# Patient Record
Sex: FEMALE | Race: WHITE | Employment: UNEMPLOYED | URBAN - METROPOLITAN AREA
[De-identification: names, ages, dates, MRNs, and addresses within clinical notes are randomized per-mention and may not be internally consistent; named-entity substitution may affect disease eponyms.]

---

## 2017-02-16 ENCOUNTER — APPOINTMENT (OUTPATIENT)
Dept: CT IMAGING | Age: 37
End: 2017-02-16
Attending: EMERGENCY MEDICINE
Payer: COMMERCIAL

## 2017-02-16 ENCOUNTER — HOSPITAL ENCOUNTER (EMERGENCY)
Age: 37
Discharge: HOME | End: 2017-02-16
Attending: EMERGENCY MEDICINE
Payer: COMMERCIAL

## 2017-02-16 ENCOUNTER — APPOINTMENT (OUTPATIENT)
Dept: ULTRASOUND IMAGING | Age: 37
End: 2017-02-16
Attending: EMERGENCY MEDICINE
Payer: COMMERCIAL

## 2017-02-16 VITALS
HEIGHT: 63 IN | HEART RATE: 87 BPM | SYSTOLIC BLOOD PRESSURE: 109 MMHG | BODY MASS INDEX: 28.77 KG/M2 | WEIGHT: 162.4 LBS | TEMPERATURE: 98.2 F | RESPIRATION RATE: 16 BRPM | OXYGEN SATURATION: 99 % | DIASTOLIC BLOOD PRESSURE: 68 MMHG

## 2017-02-16 DIAGNOSIS — R10.9 ACUTE ABDOMINAL PAIN: Primary | ICD-10-CM

## 2017-02-16 LAB
ALBUMIN SERPL BCP-MCNC: 4.1 G/DL (ref 3.5–5)
ALBUMIN/GLOB SERPL: 1.1 {RATIO} (ref 1.2–3.5)
ALP SERPL-CCNC: 44 U/L (ref 50–136)
ALT SERPL-CCNC: 21 U/L (ref 12–65)
ANION GAP BLD CALC-SCNC: 11 MMOL/L (ref 7–16)
AST SERPL W P-5'-P-CCNC: 13 U/L (ref 15–37)
BASOPHILS # BLD AUTO: 0 K/UL (ref 0–0.2)
BASOPHILS # BLD: 0 % (ref 0–2)
BILIRUB SERPL-MCNC: 0.4 MG/DL (ref 0.2–1.1)
BUN SERPL-MCNC: 9 MG/DL (ref 6–23)
CALCIUM SERPL-MCNC: 9.6 MG/DL (ref 8.3–10.4)
CHLORIDE SERPL-SCNC: 102 MMOL/L (ref 98–107)
CO2 SERPL-SCNC: 28 MMOL/L (ref 21–32)
CREAT SERPL-MCNC: 0.92 MG/DL (ref 0.6–1)
DIFFERENTIAL METHOD BLD: ABNORMAL
EOSINOPHIL # BLD: 0.1 K/UL (ref 0–0.8)
EOSINOPHIL NFR BLD: 1 % (ref 0.5–7.8)
ERYTHROCYTE [DISTWIDTH] IN BLOOD BY AUTOMATED COUNT: 15.3 % (ref 11.9–14.6)
GLOBULIN SER CALC-MCNC: 3.9 G/DL (ref 2.3–3.5)
GLUCOSE SERPL-MCNC: 99 MG/DL (ref 65–100)
HCG UR QL: NEGATIVE
HCT VFR BLD AUTO: 42.1 % (ref 35.8–46.3)
HGB BLD-MCNC: 13.4 G/DL (ref 11.7–15.4)
IMM GRANULOCYTES # BLD: 0 K/UL (ref 0–0.5)
IMM GRANULOCYTES NFR BLD AUTO: 0.2 % (ref 0–5)
LYMPHOCYTES # BLD AUTO: 27 % (ref 13–44)
LYMPHOCYTES # BLD: 2.9 K/UL (ref 0.5–4.6)
MCH RBC QN AUTO: 27.1 PG (ref 26.1–32.9)
MCHC RBC AUTO-ENTMCNC: 31.8 G/DL (ref 31.4–35)
MCV RBC AUTO: 85.1 FL (ref 79.6–97.8)
MONOCYTES # BLD: 0.6 K/UL (ref 0.1–1.3)
MONOCYTES NFR BLD AUTO: 5 % (ref 4–12)
NEUTS SEG # BLD: 7.3 K/UL (ref 1.7–8.2)
NEUTS SEG NFR BLD AUTO: 67 % (ref 43–78)
PLATELET # BLD AUTO: 362 K/UL (ref 150–450)
PMV BLD AUTO: 8.9 FL (ref 10.8–14.1)
POTASSIUM SERPL-SCNC: 3.8 MMOL/L (ref 3.5–5.1)
PROT SERPL-MCNC: 8 G/DL (ref 6.3–8.2)
RBC # BLD AUTO: 4.95 M/UL (ref 4.05–5.25)
SODIUM SERPL-SCNC: 141 MMOL/L (ref 136–145)
WBC # BLD AUTO: 10.9 K/UL (ref 4.3–11.1)

## 2017-02-16 PROCEDURE — 81003 URINALYSIS AUTO W/O SCOPE: CPT | Performed by: EMERGENCY MEDICINE

## 2017-02-16 PROCEDURE — 74011250636 HC RX REV CODE- 250/636: Performed by: EMERGENCY MEDICINE

## 2017-02-16 PROCEDURE — 81025 URINE PREGNANCY TEST: CPT

## 2017-02-16 PROCEDURE — 99284 EMERGENCY DEPT VISIT MOD MDM: CPT | Performed by: EMERGENCY MEDICINE

## 2017-02-16 PROCEDURE — 76856 US EXAM PELVIC COMPLETE: CPT

## 2017-02-16 PROCEDURE — 80053 COMPREHEN METABOLIC PANEL: CPT | Performed by: EMERGENCY MEDICINE

## 2017-02-16 PROCEDURE — 74176 CT ABD & PELVIS W/O CONTRAST: CPT

## 2017-02-16 PROCEDURE — 96374 THER/PROPH/DIAG INJ IV PUSH: CPT | Performed by: EMERGENCY MEDICINE

## 2017-02-16 PROCEDURE — 85025 COMPLETE CBC W/AUTO DIFF WBC: CPT | Performed by: EMERGENCY MEDICINE

## 2017-02-16 RX ORDER — MORPHINE SULFATE 4 MG/ML
4 INJECTION, SOLUTION INTRAMUSCULAR; INTRAVENOUS
Status: COMPLETED | OUTPATIENT
Start: 2017-02-16 | End: 2017-02-16

## 2017-02-16 RX ORDER — KETOROLAC TROMETHAMINE 30 MG/ML
30 INJECTION, SOLUTION INTRAMUSCULAR; INTRAVENOUS
Status: COMPLETED | OUTPATIENT
Start: 2017-02-16 | End: 2017-02-16

## 2017-02-16 RX ORDER — SODIUM CHLORIDE 0.9 % (FLUSH) 0.9 %
5-10 SYRINGE (ML) INJECTION AS NEEDED
Status: DISCONTINUED | OUTPATIENT
Start: 2017-02-16 | End: 2017-02-16 | Stop reason: HOSPADM

## 2017-02-16 RX ORDER — SODIUM CHLORIDE 0.9 % (FLUSH) 0.9 %
5-10 SYRINGE (ML) INJECTION EVERY 8 HOURS
Status: DISCONTINUED | OUTPATIENT
Start: 2017-02-16 | End: 2017-02-16 | Stop reason: HOSPADM

## 2017-02-16 RX ORDER — DICLOFENAC SODIUM 50 MG/1
50 TABLET, DELAYED RELEASE ORAL 2 TIMES DAILY
Qty: 14 TAB | Refills: 0 | Status: SHIPPED | OUTPATIENT
Start: 2017-02-16 | End: 2018-01-23

## 2017-02-16 RX ORDER — PROMETHAZINE HYDROCHLORIDE 25 MG/1
25 TABLET ORAL
Qty: 12 TAB | Refills: 0 | Status: SHIPPED | OUTPATIENT
Start: 2017-02-16 | End: 2019-03-21

## 2017-02-16 RX ADMIN — KETOROLAC TROMETHAMINE 30 MG: 30 INJECTION, SOLUTION INTRAMUSCULAR; INTRAVENOUS at 17:46

## 2017-02-16 RX ADMIN — MORPHINE SULFATE 4 MG: 4 INJECTION, SOLUTION INTRAMUSCULAR; INTRAVENOUS at 18:42

## 2017-02-17 NOTE — DISCHARGE INSTRUCTIONS
Return with any fevers, vomiting, blood in bowels or urine, worsening symptoms, or additional concerns. Follow up with your regular doctor for further care in 5-7 days.

## 2017-02-17 NOTE — ED PROVIDER NOTES
HPI Comments: 50-year-old lady with a history of pain in her lower abdomen into her back she says has been present off and on over the course of the past 24 hours. Patient says with her pain she has had some mild nausea but has had no vomiting or diarrhea. Patient says that she deals with pain daily due to her interstitial cystitis and her fibromyalgia she's had this pain seemed different. She denied any specific fevers or chills. Overall she's had her pain as a 10 out of 10. Elements of this note were created using speech recognition software. As such, there may be errors of speech recognition present. Patient is a 44 y.o. female presenting with abdominal pain. The history is provided by the patient. Abdominal Pain    Pertinent negatives include no fever, no diarrhea, no nausea, no vomiting, no dysuria, no hematuria, no headaches, no arthralgias, no myalgias and no chest pain. Past Medical History:   Diagnosis Date    Chronic UTI     Other ill-defined conditions(509.15)      fibromyalgia/ Interstitial cystis    Psychiatric disorder      anxiety, obsessive comp. Past Surgical History:   Procedure Laterality Date    Pr breast surgery procedure unlisted       reduction    Hx heent       tonsils/ sinus    Hx other surgical       Lymph node removal, right side of neck    Hx orthopaedic       right ankle, left foot    Pr abdomen surgery proc unlisted       inguinal hernia age 15    Hx cholecystectomy      Hx gyn       D&E/ Tubal         Family History:   Problem Relation Age of Onset    Cancer Mother     Cancer Sister        Social History     Social History    Marital status: SINGLE     Spouse name: N/A    Number of children: N/A    Years of education: N/A     Occupational History    Not on file.      Social History Main Topics    Smoking status: Current Every Day Smoker     Packs/day: 0.25    Smokeless tobacco: Never Used    Alcohol use No    Drug use: No    Sexual activity: Yes     Partners: Male     Birth control/ protection: Surgical     Other Topics Concern    Not on file     Social History Narrative         ALLERGIES: Latex; Adhesive tape-silicones; Codeine; Dilaudid [hydromorphone (bulk)]; Pcn [penicillins]; Reglan [metoclopramide]; and Sulfa (sulfonamide antibiotics)    Review of Systems   Constitutional: Negative for chills, diaphoresis and fever. HENT: Negative for congestion, rhinorrhea and sore throat. Eyes: Negative for redness and visual disturbance. Respiratory: Negative for cough, chest tightness, shortness of breath and wheezing. Cardiovascular: Negative for chest pain and palpitations. Gastrointestinal: Positive for abdominal pain. Negative for blood in stool, diarrhea, nausea and vomiting. Endocrine: Negative for polydipsia and polyuria. Genitourinary: Positive for pelvic pain. Negative for dysuria and hematuria. Musculoskeletal: Negative for arthralgias, myalgias and neck stiffness. Skin: Negative for rash. Allergic/Immunologic: Negative for environmental allergies and food allergies. Neurological: Negative for dizziness, weakness and headaches. Hematological: Negative for adenopathy. Does not bruise/bleed easily. Psychiatric/Behavioral: Negative for confusion and sleep disturbance. The patient is not nervous/anxious. Vitals:    02/16/17 1632 02/16/17 1917   BP: 106/74 114/71   Pulse: 88 93   Resp: 16    Temp: 98.5 °F (36.9 °C)    SpO2: 100% 99%   Weight: 73.7 kg (162 lb 6.4 oz)    Height: 5' 3\" (1.6 m)             Physical Exam   Constitutional: She is oriented to person, place, and time. She appears well-developed and well-nourished. HENT:   Head: Normocephalic and atraumatic. Eyes: Conjunctivae and EOM are normal. Pupils are equal, round, and reactive to light. Neck: Normal range of motion. Cardiovascular: Normal rate and regular rhythm.     Pulmonary/Chest: Effort normal and breath sounds normal. No respiratory distress. She has no wheezes. She has no rales. She exhibits no tenderness. Abdominal: Soft. Bowel sounds are normal. There is no rebound and no guarding. Mild diffuse tenderness to palpation without rebound or guarding. Musculoskeletal: Normal range of motion. She exhibits no edema or tenderness. Lymphadenopathy:     She has no cervical adenopathy. Neurological: She is alert and oriented to person, place, and time. Skin: Skin is warm and dry. Psychiatric: She has a normal mood and affect. Nursing note and vitals reviewed. MDM  Number of Diagnoses or Management Options  Diagnosis management comments: CT scan showed a questionable pelvic problem however the ultrasound was unremarkable. Her blood and urine tests were otherwise normal. I will discharge her home.     ED Course       Procedures

## 2017-04-03 ENCOUNTER — HOSPITAL ENCOUNTER (EMERGENCY)
Age: 37
Discharge: HOME | End: 2017-04-03
Attending: EMERGENCY MEDICINE
Payer: COMMERCIAL

## 2017-04-03 ENCOUNTER — APPOINTMENT (OUTPATIENT)
Dept: GENERAL RADIOLOGY | Age: 37
End: 2017-04-03
Attending: EMERGENCY MEDICINE
Payer: COMMERCIAL

## 2017-04-03 VITALS
OXYGEN SATURATION: 100 % | WEIGHT: 162 LBS | SYSTOLIC BLOOD PRESSURE: 116 MMHG | BODY MASS INDEX: 28.7 KG/M2 | TEMPERATURE: 98.1 F | DIASTOLIC BLOOD PRESSURE: 62 MMHG | HEART RATE: 95 BPM | RESPIRATION RATE: 20 BRPM | HEIGHT: 63 IN

## 2017-04-03 DIAGNOSIS — S93.601A RIGHT FOOT SPRAIN, INITIAL ENCOUNTER: Primary | ICD-10-CM

## 2017-04-03 PROCEDURE — 99283 EMERGENCY DEPT VISIT LOW MDM: CPT | Performed by: EMERGENCY MEDICINE

## 2017-04-03 PROCEDURE — 73630 X-RAY EXAM OF FOOT: CPT

## 2017-04-03 PROCEDURE — 73590 X-RAY EXAM OF LOWER LEG: CPT

## 2017-04-03 PROCEDURE — 74011250637 HC RX REV CODE- 250/637: Performed by: EMERGENCY MEDICINE

## 2017-04-03 RX ORDER — HYDROCODONE BITARTRATE AND ACETAMINOPHEN 7.5; 325 MG/1; MG/1
1 TABLET ORAL
Qty: 12 TAB | Refills: 0 | Status: SHIPPED | OUTPATIENT
Start: 2017-04-03 | End: 2017-11-14

## 2017-04-03 RX ORDER — HYDROCODONE BITARTRATE AND ACETAMINOPHEN 7.5; 325 MG/1; MG/1
1 TABLET ORAL
Status: COMPLETED | OUTPATIENT
Start: 2017-04-03 | End: 2017-04-03

## 2017-04-03 RX ADMIN — HYDROCODONE BITARTRATE AND ACETAMINOPHEN 1 TABLET: 7.5; 325 TABLET ORAL at 12:48

## 2017-04-03 NOTE — ED NOTES
I have reviewed discharge instructions with the patient. The patient verbalized understanding. Patient wheeled to car via tech in no acute distress. 1 prescription provided.       Mackenzie Mcmanus RN

## 2017-04-03 NOTE — DISCHARGE INSTRUCTIONS
Foot Sprain: Care Instructions  Your Care Instructions    A foot sprain occurs when you stretch or tear the ligaments around your foot. Ligaments are the tough tissues that connect one bone to another. A sprain can happen when you run, fall, or hit your toe against something. Sprains often happen when you jump or change direction quickly. This may occur when you play basketball, soccer, or other sports. Most foot sprains will get better with treatment at home. Follow-up care is a key part of your treatment and safety. Be sure to make and go to all appointments, and call your doctor if you are having problems. It's also a good idea to know your test results and keep a list of the medicines you take. How can you care for yourself at home? · Walk or put weight on your sprained foot as long as it does not hurt. · If your doctor gave you a splint or immobilizer, wear it as directed. If you were given crutches, use them as directed. · For the first 2 days after your injury, avoid hot showers, hot tubs, or hot packs. They may increase swelling. · Put ice or a cold pack on your foot for 10 to 20 minutes at a time to stop swelling. Try this every 1 to 2 hours for 3 days (when you are awake) or until the swelling goes down. Put a thin cloth between the ice pack and your skin. Keep your splint dry. · After 2 or 3 days, if your swelling is gone, put a heating pad (set on low) or a warm cloth on your foot. Some doctors suggest that you go back and forth between hot and cold treatments. · Prop up your foot on a pillow when you ice it or anytime you sit or lie down. Try to keep it above the level of your heart. This will help reduce swelling. · Take pain medicines exactly as directed. ¨ If the doctor gave you a prescription medicine for pain, take it as prescribed. ¨ If you are not taking a prescription pain medicine, ask your doctor if you can take an over-the-counter medicine.   · Do any exercises that your doctor or physical therapist suggests. · Return to your usual exercise gradually as you feel better. When should you call for help? Call your doctor now or seek immediate medical care if:  · You have increased or severe pain. · Your toes are cool or pale or change color. · Your wrap or splint feels too tight. · You have signs of a blood clot, such as:  ¨ Pain in your calf, back of the knee, thigh, or groin. ¨ Redness and swelling in your leg or groin. · You have tingling, weakness, or numbness in your leg or foot. Watch closely for changes in your health, and be sure to contact your doctor if:  · You cannot put any weight on your foot. · You get a fever. · You do not get better as expected. Where can you learn more? Go to http://hillary-adrianne.info/. Enter M888 in the search box to learn more about \"Foot Sprain: Care Instructions. \"  Current as of: June 21, 2016  Content Version: 11.2  © 5289-1964 [employer]. Care instructions adapted under license by [employer] (which disclaims liability or warranty for this information). If you have questions about a medical condition or this instruction, always ask your healthcare professional. [address] any warranty or liability for your use of this information.

## 2017-04-03 NOTE — ED PROVIDER NOTES
Patient is a 44 y.o. female presenting with foot injury. The history is provided by the patient and a relative. Foot Injury    This is a new problem. The current episode started less than 1 hour ago. The problem occurs constantly. The problem has not changed since onset. The pain is present in the right foot and right ankle. The quality of the pain is described as aching and constant. The pain is at a severity of 10/10. Associated symptoms include limited range of motion and stiffness. Pertinent negatives include no numbness, no tingling, no itching, no back pain and no neck pain. The symptoms are aggravated by movement and palpation. She has tried rest for the symptoms. The treatment provided no relief. There has been a history of trauma (twisted ankle when she stepped down). Past Medical History:   Diagnosis Date    Chronic UTI     Other ill-defined conditions     fibromyalgia/ Interstitial cystis    Psychiatric disorder     anxiety, obsessive comp.  Seizures ([facility] [address]        Past Surgical History:   Procedure Laterality Date    ABDOMEN SURGERY PROC UNLISTED      inguinal hernia age 15    BREAST SURGERY PROCEDURE UNLISTED      reduction    HX CHOLECYSTECTOMY      HX GYN      D&E/ Tubal    HX HEENT      tonsils/ sinus    HX ORTHOPAEDIC      right ankle, left foot    HX OTHER SURGICAL      Lymph node removal, right side of neck         Family History:   Problem Relation Age of Onset    Cancer Mother     Cancer Sister        Social History     Social History    Marital status: SINGLE     Spouse name: N/A    Number of children: N/A    Years of education: N/A     Occupational History    Not on file.      Social History Main Topics    Smoking status: Current Every Day Smoker     Packs/day: 0.25    Smokeless tobacco: Never Used    Alcohol use No    Drug use: No    Sexual activity: Yes     Partners: Male     Birth control/ protection: Surgical     Other Topics Concern    Not on file     Social History Narrative         ALLERGIES: Latex; Adhesive tape-silicones; Codeine; Dilaudid [hydromorphone (bulk)]; Pcn [penicillins]; Reglan [metoclopramide]; and Sulfa (sulfonamide antibiotics)    Review of Systems   Constitutional: Negative for chills and fever. Musculoskeletal: Positive for arthralgias and stiffness. Negative for back pain and neck pain. Skin: Negative for itching. Neurological: Negative for tingling and numbness. All other systems reviewed and are negative. Vitals:    04/03/17 1237   BP: 116/62   Pulse: 95   Resp: 20   Temp: 98.1 °F (36.7 °C)   SpO2: 100%   Weight: 73.5 kg (162 lb)   Height: 5' 3\" (1.6 m)            Physical Exam   Constitutional: She is oriented to person, place, and time. She appears well-developed and well-nourished. She appears distressed. HENT:   Head: Normocephalic and atraumatic. Right Ear: Tympanic membrane and external ear normal.   Left Ear: Tympanic membrane and external ear normal.   Mouth/Throat: Oropharynx is clear and moist.   Eyes: Conjunctivae and EOM are normal. Pupils are equal, round, and reactive to light. Neck: Normal range of motion. Neck supple. No tracheal deviation present. Cardiovascular: Normal rate, regular rhythm, normal heart sounds and intact distal pulses. Exam reveals no gallop and no friction rub. No murmur heard. Pulmonary/Chest: Effort normal and breath sounds normal. No respiratory distress. She has no wheezes. Musculoskeletal: She exhibits no edema. Right foot: There is decreased range of motion, tenderness, bony tenderness and swelling. There is normal capillary refill, no crepitus, no deformity and no laceration. Feet:    Lymphadenopathy:     She has no cervical adenopathy. Neurological: She is alert and oriented to person, place, and time. She has normal strength. She displays normal reflexes. No cranial nerve deficit or sensory deficit. Skin: Skin is warm and dry. No rash noted.  She is not diaphoretic. No erythema. Psychiatric: She has a normal mood and affect. Nursing note and vitals reviewed. MDM  ED Course       Procedures    The patient was observed in the ED. Results Reviewed:      XR TIB/FIB RT   Final Result   IMPRESSION: No acute osseous abnormality. XR FOOT RT MIN 3 V   Final Result   IMPRESSION: No acute osseous abnormality. I discussed the results of all labs, procedures, radiographs, and treatments with the patient and available family. Treatment plan is agreed upon and the patient is ready for discharge. All voiced understanding of the discharge plan and medication instructions or changes as appropriate. Questions about treatment in the ED were answered. All were encouraged to return should symptoms worsen or new problems develop.

## 2017-04-03 NOTE — ED TRIAGE NOTES
Patient states she stepped down today, felt a pop and rolled her right foot and ankle approx 1 hour ago. Bruising to right foot. Patient states she is unable to bear weight.

## 2017-11-14 PROBLEM — F34.1 DYSTHYMIA: Status: ACTIVE | Noted: 2017-11-14

## 2017-11-14 PROBLEM — G40.909 SEIZURE DISORDER (HCC): Status: ACTIVE | Noted: 2017-11-14

## 2017-11-14 PROBLEM — K21.00 GASTROESOPHAGEAL REFLUX DISEASE WITH ESOPHAGITIS: Status: ACTIVE | Noted: 2017-11-14

## 2017-11-14 PROBLEM — N30.10 INTERSTITIAL CYSTITIS: Status: ACTIVE | Noted: 2017-11-14

## 2017-11-14 PROBLEM — F51.01 PRIMARY INSOMNIA: Status: ACTIVE | Noted: 2017-11-14

## 2017-11-14 PROBLEM — M79.7 FIBROMYALGIA: Status: ACTIVE | Noted: 2017-11-14

## 2017-11-14 PROBLEM — J45.40 MODERATE PERSISTENT ASTHMA WITHOUT COMPLICATION: Status: ACTIVE | Noted: 2017-11-14

## 2017-12-05 PROBLEM — N30.10 INTERSTITIAL CYSTITIS: Chronic | Status: ACTIVE | Noted: 2017-11-14

## 2017-12-05 PROBLEM — M35.1 MIXED CONNECTIVE TISSUE DISEASE (HCC): Status: ACTIVE | Noted: 2017-12-05

## 2017-12-05 PROBLEM — E55.9 VITAMIN D DEFICIENCY: Status: ACTIVE | Noted: 2017-12-05

## 2017-12-05 PROBLEM — M35.1 MIXED CONNECTIVE TISSUE DISEASE (HCC): Chronic | Status: ACTIVE | Noted: 2017-12-05

## 2017-12-19 PROBLEM — F80.81 STUTTERING: Status: ACTIVE | Noted: 2017-12-19

## 2017-12-19 PROBLEM — Z79.899 ENCOUNTER FOR LONG-TERM (CURRENT) USE OF MEDICATIONS: Chronic | Status: ACTIVE | Noted: 2017-12-19

## 2017-12-19 PROBLEM — Z79.899 ENCOUNTER FOR LONG-TERM (CURRENT) USE OF MEDICATIONS: Status: ACTIVE | Noted: 2017-12-19

## 2018-01-10 ENCOUNTER — HOSPITAL ENCOUNTER (OUTPATIENT)
Dept: ULTRASOUND IMAGING | Age: 38
Discharge: HOME | End: 2018-01-10
Attending: FAMILY MEDICINE
Payer: COMMERCIAL

## 2018-01-10 DIAGNOSIS — R10.2 PELVIC PAIN: ICD-10-CM

## 2018-01-10 PROBLEM — D25.2 SUBSEROUS LEIOMYOMA OF UTERUS: Status: ACTIVE | Noted: 2018-01-10

## 2018-01-10 PROBLEM — D25.2 SUBSEROUS LEIOMYOMA OF UTERUS: Chronic | Status: ACTIVE | Noted: 2018-01-10

## 2018-01-10 PROCEDURE — 76830 TRANSVAGINAL US NON-OB: CPT

## 2018-01-10 NOTE — PROGRESS NOTES
Pelvic ultrasound: there is a 1.8 cm uterine fibroid of the fundus. There is also a 1.8 x 1.3 cm echogenic area that may be a polyp. Will place a referral to a gynecologist for further evaluation.

## 2018-01-23 PROBLEM — N92.0 MENORRHAGIA WITH REGULAR CYCLE: Status: ACTIVE | Noted: 2018-01-23

## 2018-01-23 PROBLEM — Z72.0 TOBACCO ABUSE: Status: ACTIVE | Noted: 2018-01-23

## 2018-01-23 PROBLEM — N94.6 DYSMENORRHEA: Status: ACTIVE | Noted: 2018-01-23

## 2018-02-22 ENCOUNTER — HOSPITAL ENCOUNTER (OUTPATIENT)
Dept: GENERAL RADIOLOGY | Age: 38
Discharge: HOME | End: 2018-02-22
Attending: INTERNAL MEDICINE
Payer: COMMERCIAL

## 2018-02-22 DIAGNOSIS — M54.89 INFLAMMATORY BACK PAIN: ICD-10-CM

## 2018-02-22 DIAGNOSIS — M13.0 POLYARTHRITIS: ICD-10-CM

## 2018-02-22 PROCEDURE — 73130 X-RAY EXAM OF HAND: CPT

## 2018-02-22 PROCEDURE — 72170 X-RAY EXAM OF PELVIS: CPT

## 2018-02-22 PROCEDURE — 73630 X-RAY EXAM OF FOOT: CPT

## 2018-02-22 PROCEDURE — 72100 X-RAY EXAM L-S SPINE 2/3 VWS: CPT

## 2018-05-23 PROBLEM — L40.50 PSORIATIC ARTHRITIS (HCC): Status: ACTIVE | Noted: 2018-05-23

## 2019-01-24 ENCOUNTER — HOSPITAL ENCOUNTER (OUTPATIENT)
Dept: GENERAL RADIOLOGY | Age: 39
Discharge: HOME | End: 2019-01-24
Payer: COMMERCIAL

## 2019-01-24 DIAGNOSIS — M25.562 ACUTE PAIN OF LEFT KNEE: ICD-10-CM

## 2019-01-24 PROBLEM — F33.0 MILD EPISODE OF RECURRENT MAJOR DEPRESSIVE DISORDER (HCC): Status: ACTIVE | Noted: 2019-01-24

## 2019-01-24 PROCEDURE — 73564 X-RAY EXAM KNEE 4 OR MORE: CPT

## 2019-01-28 NOTE — PROGRESS NOTES
X-ray of her knee is negative. Symptoms are most likely related to psoriatic arthritis. Continue to follow-up with rheumatology.

## 2019-03-21 PROBLEM — Z12.4 PAP SMEAR FOR CERVICAL CANCER SCREENING: Status: ACTIVE | Noted: 2019-03-21

## 2019-03-21 PROBLEM — Z12.31 SCREENING MAMMOGRAM, ENCOUNTER FOR: Status: ACTIVE | Noted: 2019-03-21

## 2019-03-21 PROBLEM — Z01.419 WOMEN'S ANNUAL ROUTINE GYNECOLOGICAL EXAMINATION: Status: ACTIVE | Noted: 2019-03-21

## 2019-04-09 ENCOUNTER — HOSPITAL ENCOUNTER (OUTPATIENT)
Dept: MAMMOGRAPHY | Age: 39
Discharge: HOME | End: 2019-04-09
Attending: OBSTETRICS & GYNECOLOGY
Payer: COMMERCIAL

## 2019-04-09 DIAGNOSIS — Z12.31 SCREENING MAMMOGRAM, ENCOUNTER FOR: ICD-10-CM

## 2019-04-09 PROCEDURE — 77067 SCR MAMMO BI INCL CAD: CPT

## 2019-05-13 ENCOUNTER — HOSPITAL ENCOUNTER (OUTPATIENT)
Dept: PHYSICAL THERAPY | Age: 39
Discharge: HOME | End: 2019-05-13
Attending: INTERNAL MEDICINE
Payer: COMMERCIAL

## 2019-05-13 NOTE — PROGRESS NOTES
Adam Arredondo  : 1980  Primary: [facility]  Secondary:  [facility] [address] [facility], [address]  [phone]   [phone]      PT Note:    Patient cancelled PT evaluation this AM secondary to illness. Patient to reschedule.      Dolly Hamlin, PT, DPT

## 2019-05-31 ENCOUNTER — HOSPITAL ENCOUNTER (OUTPATIENT)
Dept: PHYSICAL THERAPY | Age: 39
Discharge: HOME | End: 2019-05-31
Attending: INTERNAL MEDICINE
Payer: COMMERCIAL

## 2019-05-31 PROCEDURE — 97162 PT EVAL MOD COMPLEX 30 MIN: CPT

## 2019-05-31 NOTE — PROGRESS NOTES
Nisha Clements  : 1980  Primary: [facility]  [facility]:  [facility], [address]  [phone]   [phone]       OUTPATIENT PHYSICAL THERAPY: Daily Treatment Note 2019  Visit Count:  1  ICD-10: Treatment Diagnosis: Pain in left hip (M25.552)   Pain in right hip (M25.551)    Difficulty in walking, not elsewhere classified (R26.2)  Precautions/Allergies:   Latex; Adhesive tape-silicones; Codeine; Dilaudid [hydromorphone (bulk)]; Pcn [penicillins]; Reglan [metoclopramide]; and Sulfa (sulfonamide antibiotics)   TREATMENT PLAN:  Effective Dates/Frequency/Duration: Twice per week from 2019 until 2019 (8 weeks). Pre-treatment Symptoms/Complaints:  See history in chart  Pain: Initial:   2-3/10 Post Session:  4/10   Medications Last Reviewed:  2019  Updated Objective Findings: See evaluation note from today   TREATMENT:   Assessment only today, no treatment provided. Treatment/Session Summary:    · Response to Treatment:  See assessment in chart. No adverse reactions/unusual changes observed/reported in clinical status this session.   · Communication/Consultation:  None today  · Equipment provided today:  None today  · Recommendations/Intent for next treatment session: Next visit will focus on working on manual therapy/modalities as needed to reduce pain; gentle LE strengthening exercises    Total Treatment Billable Duration:  0 minutes  PT Patient Time In/Time Out  Time In: 1345  Time Out: Ari Hopkins DPT    Future Appointments   Date Time Provider David Ricardo   2019 10:40 AM Mamadou Ordaz MD [facility] P[facility] POW   2019 11:40 AM Sp Howell MD [facility]

## 2019-05-31 NOTE — THERAPY EVALUATION
Maricruz Escoto  : 1980  Primary: [facility]  Secondary:  Therapy Center at [facility] [address], [address]  [phone]   [phone]        OUTPATIENT PHYSICAL THERAPY:Initial Assessment 2019   ICD-10: Treatment Diagnosis: Pain in left hip (M25.552)   Pain in right hip (M25.551)    Difficulty in walking, not elsewhere classified (R26.2)  Precautions/Allergies:   Latex; Adhesive tape-silicones; Codeine; Dilaudid [hydromorphone (bulk)]; Pcn [penicillins]; Reglan [metoclopramide]; and Sulfa (sulfonamide antibiotics)   TREATMENT PLAN:  Effective Dates/Frequency/Duration: Twice per week from 2019 until 2019 (8 weeks). MEDICAL/REFERRING DIAGNOSIS:  Pain in right hip [M25.551]  Pain in left hip [M25.552]   DATE OF ONSET: 4 years prior to initial evaluation, with gradual worsening over time  REFERRING PHYSICIAN: Alton Li MD MD Orders: Evaluate and treat  Return MD Appointment: unspecified     INITIAL ASSESSMENT:  Maricruz Escoto is a 35 y.o. female who presents to physical therapy for gradual worsening of B hip pain. This session, pt demonstrated decreased B LE strength/endurance, increased pain with B hip mobility, multiple postural/gait deficits, decreased standing tolerance and decreased functional mobility as evident by a score of 36/80 on the Lower Extremity Functional Scale (with lower scores indicating increased disability) and decreased ability to perform sit to stand transition and daily chores without pain. Pt may benefit from skilled PT to address the above listed deficits to improve ability to perform pain-free ADLs/IADLs and to improve overall quality of life prior to discharge. PROBLEM LIST (Impacting functional limitations):  1. Decreased Strength  2. Decreased ADL/Functional Activities  3. Decreased Transfer Abilities  4. Decreased Ambulation Ability/Technique  5.  Decreased Balance  6. Increased Pain  7. Decreased Activity Tolerance  8. Decreased Pacing Skills  9. Increased Fatigue  10. Decreased Flexibility/Joint Mobility  11. Edema/Girth INTERVENTIONS PLANNED: (Treatment may consist of any combination of the following)  1. Balance Exercise  2. Bed Mobility  3. Cold  4. Electrical Stimulation  5. Family Education  6. Gait Training  7. Heat  8. Home Exercise Program (HEP)  9. Manual Therapy  10. Neuromuscular Re-education/Strengthening  11. Range of Motion (ROM)  12. Therapeutic Activites  13. Therapeutic Exercise/Strengthening  14. Transcutaneous Electrical Nerve Stimulation (TENS)  15. Transfer Training  16. Ultrasound (US)  17. Aquatic Therapy     GOALS: (Goals have been discussed and agreed upon with patient.)  Short-Term Functional Goals: Time Frame: 4 weeks  1. Pt will be compliant with HEP in order to increase LE strength/endurance/mobility to improve functional mobility and overall quality of life. 2. Pt will improve score on the Lower Extremity Functional Scale to 40/80 in order to demonstrate improved functional mobility and quality of life. 3. Pt will improve score on Timed Up and Go Test to 10 seconds with the least restrictive assistive device in order to decrease fall risk and increase safety and independence during ambulation. 4. Pt will report standing for > 15 minutes with minimal to no increase in pain in order to be able to stand for prolonged periods as needed to perform chores around her house. Discharge Goals: Time Frame: 8 weeks  1. Pt will be independent with HEP in order to increase LE strength/endurance/mobility to improve functional mobility and overall quality of life. 2. Pt will improve score on the Lower Extremity Functional Scale to 44/80 in order to demonstrate improved functional mobility and quality of life.    3. Pt will improve score on Timed Up and Go Test to 9 seconds with the least restrictive assistive device in order to decrease fall risk and increase safety and independence during ambulation. 4. Pt will report standing for > 20 minutes with minimal to no increase in pain in order to be able to stand for prolonged periods as needed to perform chores around her house. OUTCOME MEASURE:   Tool Used: Lower Extremity Functional Scale (LEFS)  Score:  Initial: 36/80 Most Recent: X/80 (Date: -- )   Interpretation of Score: 20 questions each scored on a 5 point scale with 0 representing \"extreme difficulty or unable to perform\" and 4 representing \"no difficulty\". The lower the score, the greater the functional disability. 80/80 represents no disability. Minimal detectable change is 9 points. Tool Used: Timed Up and Go (TUG)  Score:  Initial: 11 seconds no AD Most Recent: X seconds (Date: -- )   Interpretation of Score: The test measures, in seconds, the time taken by an individual to stand up from a standard arm chair (seat height 46 cm [18 in], arm height 65 cm [25.6 in]), walk a distance of 3 meters (118 in, approx 10 ft), turn, walk back to the chair and sit down. If the individual takes longer than 14 seconds to complete TUG, this indicates risk for falls. UPDATED OBJECTIVE FINDINGS:     See objective section below    FALL RISK:    Ambulatory/Rehab Services H2 Model Falls Risk Assessment   Risk Factors:       (2)  Any administered antiepileptics/anticonvulsants Ability to Rise from Chair:       (0)  Ability to rise in a single movement   Falls Prevention Plan:       No modifications necessary   Total: (5 or greater = High Risk): 2   ©2010 [facility] [employer]. All Rights Reserved. [facility] States Patent #4,[id number].  Federal Law prohibits the replication, distribution or use without written permission from [facility] of [address] [place] [address]:   · Patient is expected to demonstrate progress in strength, range of motion and functional technique to improve ability to perform pain-free ADLs/IADLs and to improve overall quality of life.  REASON FOR SERVICES/OTHER COMMENTS:  · Patient continues to require modification of therapeutic interventions to increase complexity of exercises. Total Duration:  PT Patient Time In/Time Out  Time In: 1345  Time Out: 1435    Rehabilitation Potential For Stated Goals: [address] therapy, I certify that the treatment plan above will be carried out by a therapist or under their direction. Thank you for this referral,  Man Tam DPT     Referring Physician Signature: Alvaro Hensley MD _______________________________ Date _____________     PAIN/SUBJECTIVE:   Initial: 2-3/10 Post Session:  4/10   HISTORY:   History of Injury/Illness (Reason for Referral): *History per pt or pt's family except where otherwise noted  Pt states she has connective tissue disease (mixed connective tissue disease- is on steroids about half of each year) and psoriatic arthritis, as well as spinal stenosis with several disc herniations. States for the past 4 years, her hips have hurt really badly. States the pain began slowly at first, but then got worse over time. States it makes it very difficult for her to stand up and walk (feels like one of her hips might give way). Pt states she has had to have surgeries on both feet due to fractures - she thinks this affects her ambulation, but hips started hurting many years after the surgeries (so does not seem to be a causal factor). Pt has difficulty bending down to pick items off floor and helping her  get dressed. She also has difficulty doing household chores, and she has to limit how often she cleans.   Past Medical History/Comorbidities:   Ms. Helen Ohara  has a past medical history of Abnormal Papanicolaou smear of cervix, Chronic UTI, Gastroesophageal reflux disease with esophagitis (11/14/2017), Moderate persistent asthma without complication (34/02/6339), Other ill-defined conditions(799.89), Primary insomnia (11/14/2017), Psychiatric disorder, Seizure disorder ([facility] [address] (11/14/2017), and Seizures ([facility] [address] She also has no past medical history of Aneurysm ([address] Arrhythmia, Arthritis, Autoimmune disease ([address] CAD (coronary artery disease), Cancer ([facility] [address] Chronic kidney disease, Chronic pain, COPD, Dementia, Diabetes ([facility] [address] Difficult intubation, Endocrine disease, Heart failure ([address] Hypertension, Infectious disease, Liver disease, Malignant hyperthermia due to anesthesia, Nausea & vomiting, Pseudocholinesterase deficiency, PUD (peptic ulcer disease), Stroke ([facility] [address] Thromboembolus ([facility] [address] Thyroid disease, Unspecified adverse effect of anesthesia, or Unspecified sleep apnea. Ms. Pema Alanis  has a past surgical history that includes pr breast surgery procedure unlisted; hx heent; hx other surgical; hx orthopaedic; pr abdomen surgery proc unlisted; hx cholecystectomy; hx gyn; hx tonsillectomy; hx appendectomy; pr lap,tubal cautery; and hx carpal tunnel release (Right, 04/10/2019). 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 y.o. Current Medications:       Current Outpatient Medications:     etanercept (ENBREL MINI) 50 mg/mL (0.98 mL) crtg, 50 mg by SubCUTAneous route every seven (7) days. , Disp: 3.94 mL, Rfl: 4    medroxyPROGESTERone (PROVERA) 10 mg tablet, Take 1 tab by mouth x 14 days each month as directed, Disp: 14 Tab, Rfl: 11    venlafaxine-SR (EFFEXOR-XR) 75 mg capsule, Take 1 Cap by mouth daily. , Disp: 30 Cap, Rfl: 5    ergocalciferol (ERGOCALCIFEROL) 50,000 unit capsule, TAKE 1 CAPSULE BY MOUTH EVERY 7 DAYS, Disp: 4 Cap, Rfl: 5    pantoprazole (PROTONIX) 40 mg tablet, Take 1 Tab by mouth daily. , Disp: 30 Tab, Rfl: 5    pentosan polysulfate sodium (ELMIRON) 100 mg capsule, Take 1 Cap by mouth Before breakfast, lunch, and dinner., Disp: 90 Cap, Rfl: 5    vitamin C-multivitamin-mineral 500 mg chew, Take 600 mg by mouth., Disp: , Rfl:     etodolac (LODINE) 400 mg tablet, Take  by mouth two (2) times daily (with meals). , Disp: , Rfl:     gabapentin (NEURONTIN) 600 mg tablet, Take 1,200 mg by mouth four (4) times daily. , Disp: , Rfl:     amitriptyline (ELAVIL) 10 mg tablet, Take  by mouth nightly., Disp: , Rfl:     levETIRAcetam (KEPPRA) 750 mg tablet, Take 750 mg by mouth two (2) times a day., Disp: , Rfl:     albuterol (VENTOLIN HFA) 90 mcg/actuation inhaler, Take  by inhalation. , Disp: , Rfl:     budesonide-formoterol (SYMBICORT) 160-4.5 mcg/actuation HFAA, Take 2 Puffs by inhalation two (2) times a day., Disp: , Rfl:     SINGULAIR 10 mg tablet, take 10 mg by mouth daily.  , Disp: , Rfl:    Date Last Reviewed:  5/31/2019    Number of Personal Factors/Comorbidities that affect the Plan of Care: 3+: HIGH COMPLEXITY   EXAMINATION:   Initial assessment on 5/31/2019   Observation/Orthostatic Postural Assessment:    The following postural deficits were noted in sitting: loss of cervical lordosis, increased thoracic kyphosis, forward head, elevated R shoulder, rounded shoulders and posterior pelvic tilt   The following postural deficits were noted in standing: forward trunk flexion, pt is overweight  Palpation:          PA mobilizations at L1 non-painful but stiff; PA mobilizations at L2-5 stiff and very painful (pt guarding throughout), PA mobilizations at B PSIS painful and stiff  ROM:    Initial measurement: (on 5/31/2019) Initial measurement: (on 5/31/2019) Reassessment measurement:  Reassessment measurement:      WFL throughout L LE, but painful at end range with following motions: hip flexion and ER (painful in hip joint), hip IR (painful in posterior L hip) WFL throughout R LE, but painful at end range with following motions: hip flexion and ER (painful in hip joint), hip IR (painful in posterior R hip)       Strength:     Initial measurement: (on 5/31/2019) Initial measurement: (on 5/31/2019)  Reassessment Reassessment    L LE: R LE:     Hip flexion  4/5 (slight pain in back) 4+/5 (pain in back and R posterior hip)     Hip extension 3+/5 (pain in lower back) 3+/5 (pain in lower back)     Hip abduction 4/5 (pain in L groin) 4/5 (pain in R groin)     Hip adduction 4+/5 (pain in L pelvic crest) 4+/5 (pain in R pelvic crest)     Hip IR  3+/5 (pain in hip joint) 4-/5 (pain in hip joint)     Hip ER 4/5 (pain in groin and hamstrings) 4/5 (pain in groin and hamstrings)     Knee flexion 4/5  4+/5 (pain in low back)     Knee extension 4/5  5/5      Ankle DF  4-/5  4-/5        Special Tests:          Scours: + for increased pain at B hips  No pelvic or LE asymmetry noted  Neurological Screen:        Myotomes:  WFL        Dermatomes:  No deficits noted  Functional Mobility:   Gait/Ambulation: Pt ambulates with no AD with following gait deficits: increased forward trunk lean, slower gait speed, altered arm swing, decreased trunk rotation, decreased B stance time, decreased B step length, excessive L knee flexion during L stance phase, excessive R knee flexion during R stance phase, decreased B foot clearance and decreased B heel strike         Transfers:  WFL        Bed Mobility:  [facility]  Balance:          Decreased - slight lateral sway during ambulation  Sensation:         No deficits noted      Body Structures Involved:  1. Nerves  2. Bones  3. Joints  4. Muscles  5. Ligaments Body Functions Affected:  1. Sensory/Pain  2. Neuromusculoskeletal  3. Movement Related  4. Skin Related Activities and Participation Affected:  1. General Tasks and Demands  2. Mobility  3. Self Care  4. Domestic Life  5. Interpersonal Interactions and Relationships  6.  Community, Social and [place]   Number of elements (examined above) that affect the Plan of Care: 4+: HIGH COMPLEXITY   CLINICAL PRESENTATION:   Presentation: Evolving clinical presentation with changing clinical characteristics: MODERATE COMPLEXITY   CLINICAL DECISION MAKING:   Use of outcome tool(s) and clinical judgement create a POC that gives a: Questionable prediction of patient's progress: MODERATE COMPLEXITY

## 2019-06-10 ENCOUNTER — HOSPITAL ENCOUNTER (OUTPATIENT)
Dept: PHYSICAL THERAPY | Age: 39
Discharge: HOME | End: 2019-06-10
Attending: INTERNAL MEDICINE
Payer: COMMERCIAL

## 2019-06-10 PROCEDURE — 97110 THERAPEUTIC EXERCISES: CPT

## 2019-06-10 NOTE — PROGRESS NOTES
Baljit Wall  : 1980  Primary: [facility]  Secondary:  [address]  at [facility]  [address] [facility], [place] [address] [address] [facility]  [phone]   [phone]       OUTPATIENT PHYSICAL THERAPY: Daily Treatment Note 6/10/2019  Visit Count:  2  ICD-10: Treatment Diagnosis: Pain in left hip (M25.552)   Pain in right hip (M25.551)    Difficulty in walking, not elsewhere classified (R26.2)  Precautions/Allergies:   Latex; Adhesive tape-silicones; Codeine; Dilaudid [hydromorphone (bulk)]; Pcn [penicillins]; Reglan [metoclopramide]; and Sulfa (sulfonamide antibiotics)   TREATMENT PLAN:  Effective Dates/Frequency/Duration: Twice per week from 2019 until 2019 (8 weeks). Pre-treatment Symptoms/Complaints:  Pt 20 minutes late to session today - states her  had last minute appointment that he had to go to (states he was in hospital at her last missed appointment)  Pain: Initial:   -3/10 Post Session:  No pain verbalized at end of session   Medications Last Reviewed:  6/10/2019  Updated Objective Findings: None Today   TREATMENT:   Therapeutic Exercise: ( 23 minutes):  Exercises per grid below to improve mobility, strength and dynamic movement of leg - bilateral to improve functional mobility. Required minimal visual, verbal and manual cues to promote proper body alignment, promote proper body posture, promote proper body mechanics and promote proper body breathing techniques. Progressed resistance, range, repetitions and complexity of movement as indicated.     Date:  6/10/2019 Date:   Date:   Activity/Exercise Parameters Parameters    Nustep  L2 resistance for 5 minutes with B LEs only to increase strength/endurance     Ankle plantarflexor stretch on incline board 30 second hold x 3 reps with knees extended then flexed     LTR in hooklying 20 reps/1 set with 5-10 second hold each direction     Supine hamstring stretch 30 second hold x 3 reps each LE with pt stretching with belt - cues to hold for stretch     Geovanny test stretch in supine 30 second hold x 3 reps each LE with overpressure by therapist                 *given in HEP  [employer] Portal   Pt given following band colors: [] Yellow          [] Red          [] Green          [] Blue          [] Grey     Treatment/Session Summary:    · Response to Treatment:  Pt able to perform all LE exercises this session with minimal to no reports of increased pain. Pt would benefit from aquatic therapy for further LE strengthening, since she has increased pain with longer periods of weightbearing, with goal of developing HEP that she can work on at [facility]. No adverse reactions/unusual changes observed/reported in clinical status this session.   · Communication/Consultation:  None today  · Equipment provided today:  None today  · Recommendations/Intent for next treatment session: Next visit will focus on working on manual therapy/modalities as needed to reduce pain; gentle LE strengthening exercises    Total Treatment Billable Duration:  23 minutes  PT Patient Time In/Time Out  Time In: 1450  Time Out: [facility]    Future Appointments   Date Time Provider David Ricardo   6/14/2019  1:00 PM Rohan Gregorio DPT [facility] SFD   7/24/2019 10:40 AM Alcon Montalvo MD [facility] POW POW   8/6/2019 11:40 AM Chey Knowles MD [facility]

## 2019-06-14 ENCOUNTER — HOSPITAL ENCOUNTER (OUTPATIENT)
Dept: PHYSICAL THERAPY | Age: 39
Discharge: HOME | End: 2019-06-14
Attending: INTERNAL MEDICINE
Payer: COMMERCIAL

## 2019-06-14 PROCEDURE — 97110 THERAPEUTIC EXERCISES: CPT

## 2019-06-14 NOTE — PROGRESS NOTES
Kate Patrick  : 1980  Primary: [facility]  Secondary:  [address]  at [facility]  [address], [address], [place] [address] [address] [facility]  [phone]   [phone]       OUTPATIENT PHYSICAL THERAPY: Daily Treatment Note 2019  Visit Count:  3  ICD-10: Treatment Diagnosis: Pain in left hip (M25.552)   Pain in right hip (M25.551)    Difficulty in walking, not elsewhere classified (R26.2)  Precautions/Allergies:   Latex; Adhesive tape-silicones; Codeine; Dilaudid [hydromorphone (bulk)]; Pcn [penicillins]; Reglan [metoclopramide]; and Sulfa (sulfonamide antibiotics)   TREATMENT PLAN:  Effective Dates/Frequency/Duration: Twice per week from 2019 until 2019 (8 weeks). Pre-treatment Symptoms/Complaints:  Pt states no issues since last session  Pain: Initial:   7/10 Post Session:  4/10   Medications Last Reviewed:  2019  Updated Objective Findings: None Today   TREATMENT:   Therapeutic Exercise: ( 40 minutes):  Exercises per grid below to improve mobility, strength and dynamic movement of leg - bilateral to improve functional mobility. Required minimal visual, verbal and manual cues to promote proper body alignment, promote proper body posture, promote proper body mechanics and promote proper body breathing techniques. Progressed resistance, range, repetitions and complexity of movement as indicated.     Date:  6/10/2019 Date:  2019 Date:   Activity/Exercise Parameters Parameters    Nustep  L2 resistance for 5 minutes with B LEs only to increase strength/endurance L3 resistance for 10 minutes with B LEs only to increase strength/endurance    Ankle plantarflexor stretch on incline board 30 second hold x 3 reps with knees extended then flexed 30 second hold x 3 reps with knees extended then flexed    LTR in hooklying 20 reps/1 set with 5-10 second hold each direction 20 reps/1 set with 5-10 second hold each direction    Supine hamstring stretch* 30 second hold x 3 reps each LE with pt stretching with belt - cues to hold for stretch 30 second hold x 3 reps each LE with pt stretching with belt - cues to hold for stretch    Geovanny test stretch in supine* 30 second hold x 3 reps each LE with overpressure by therapist 30 second hold x 3 reps each LE with overpressure by therapist    Bridging in hooklying*  20 reps/1 set; pt reporting slight discomfort in lower back and groin    Sidelying clamshells*  20 reps/1 set; with cues for correct technique                            *given in HEP  [employer] Portal   Pt given following band colors: [] Yellow          [] Red          [] Green          [] Blue          [] Grey     Treatment/Session Summary:    · Response to Treatment:  Pt demonstrating good ability to perform all exercises this session with less pain reported at end of session. No adverse reactions/unusual changes observed/reported in clinical status this session.   · Communication/Consultation:  None today  · Equipment provided today:  None today  · Recommendations/Intent for next treatment session: Next visit will focus on working on manual therapy/modalities as needed to reduce pain; gentle LE strengthening exercises    Total Treatment Billable Duration:  40 minutes  PT Patient Time In/Time Out  Time In: 1259  Time Out: [address], DPT    Future Appointments   Date Time Provider David Ricardo   6/17/2019  1:00 PM Fiona Trimble DPT [facility] SFD   6/20/2019  1:00 PM EMEKA Momin [facility]LL   6/24/2019  1:00 PM EMEKA Momin   6/28/2019  1:00 PM Fiona Trimble DPT [facility] SFD   7/24/2019 10:40 AM Kami Ta MD [facility] POW POW   8/6/2019 11:40 AM Kei Parks MD [facility]

## 2019-06-17 ENCOUNTER — HOSPITAL ENCOUNTER (OUTPATIENT)
Dept: PHYSICAL THERAPY | Age: 39
Discharge: HOME | End: 2019-06-17
Attending: INTERNAL MEDICINE
Payer: COMMERCIAL

## 2019-06-17 NOTE — PROGRESS NOTES
Therapy Center at [facility]   [address], [address] [facility], [address]  [phone]   [phone]     OUTPATIENT DAILY NOTE    NAME: Sudhakar Holguin    DATE: 6/17/2019    Patient canceled physical therapy appointment today due to having headache. Will plan to follow up on next scheduled visit.     Wilman Muñoz DPT    Future Appointments   Date Time Provider David Ricardo   6/20/2019  1:00 PM Ewelina Hayes DPT [facility]   6/24/2019  1:00 PM Ewelina Hayes DPT [facility]   6/25/2019 10:15 AM Jarod Toney [facility]   7/2/2019 11:00 AM Jarod Toney [facility]   7/5/2019 11:00 AM Ewelina Hayes DPT [facility] D   7/9/2019 11:45 [facility]   7/11/2019 11:00 AM Eddie MARIEE DPT [facility]   7/15/2019 11:00 AM Ewelina Hayes DPT [facility]   7/18/2019 11:45 AM Jarod Toney [facility]   7/24/2019 10:40 AM Janes Arnold MD [facility] POW POW   8/1/2019 11:45 AM Jarod Toney [facility]   8/2/2019 11:00 AM Eddie MARIEE DPT [facility]   8/6/2019 11:40 AM Mary Jane Santos MD [facility]

## 2019-06-25 ENCOUNTER — APPOINTMENT (OUTPATIENT)
Dept: PHYSICAL THERAPY | Age: 39
End: 2019-06-25
Payer: COMMERCIAL

## 2019-06-28 ENCOUNTER — APPOINTMENT (OUTPATIENT)
Dept: PHYSICAL THERAPY | Age: 39
End: 2019-06-28
Attending: INTERNAL MEDICINE
Payer: COMMERCIAL

## 2019-07-02 ENCOUNTER — APPOINTMENT (OUTPATIENT)
Dept: PHYSICAL THERAPY | Age: 39
End: 2019-07-02

## 2019-07-05 ENCOUNTER — APPOINTMENT (OUTPATIENT)
Dept: PHYSICAL THERAPY | Age: 39
End: 2019-07-05
Attending: INTERNAL MEDICINE

## 2019-07-09 ENCOUNTER — APPOINTMENT (OUTPATIENT)
Dept: PHYSICAL THERAPY | Age: 39
End: 2019-07-09

## 2019-07-11 ENCOUNTER — APPOINTMENT (OUTPATIENT)
Dept: PHYSICAL THERAPY | Age: 39
End: 2019-07-11
Attending: INTERNAL MEDICINE

## 2019-07-15 ENCOUNTER — APPOINTMENT (OUTPATIENT)
Dept: PHYSICAL THERAPY | Age: 39
End: 2019-07-15
Attending: INTERNAL MEDICINE

## 2019-07-18 ENCOUNTER — APPOINTMENT (OUTPATIENT)
Dept: PHYSICAL THERAPY | Age: 39
End: 2019-07-18

## 2019-07-24 PROBLEM — Z12.4 PAP SMEAR FOR CERVICAL CANCER SCREENING: Status: RESOLVED | Noted: 2019-03-21 | Resolved: 2019-07-24

## 2019-07-24 PROBLEM — F34.1 DYSTHYMIA: Status: RESOLVED | Noted: 2017-11-14 | Resolved: 2019-07-24

## 2019-07-24 PROBLEM — Z79.899 ENCOUNTER FOR LONG-TERM (CURRENT) USE OF MEDICATIONS: Chronic | Status: RESOLVED | Noted: 2017-12-19 | Resolved: 2019-07-24

## 2019-07-24 PROBLEM — N94.6 DYSMENORRHEA: Status: RESOLVED | Noted: 2018-01-23 | Resolved: 2019-07-24

## 2019-07-24 PROBLEM — Z12.31 SCREENING MAMMOGRAM, ENCOUNTER FOR: Status: RESOLVED | Noted: 2019-03-21 | Resolved: 2019-07-24

## 2019-08-01 ENCOUNTER — APPOINTMENT (OUTPATIENT)
Dept: PHYSICAL THERAPY | Age: 39
End: 2019-08-01

## 2019-08-02 ENCOUNTER — APPOINTMENT (OUTPATIENT)
Dept: PHYSICAL THERAPY | Age: 39
End: 2019-08-02
Attending: INTERNAL MEDICINE

## 2019-08-30 ENCOUNTER — APPOINTMENT (OUTPATIENT)
Dept: CT IMAGING | Age: 39
End: 2019-08-30
Attending: EMERGENCY MEDICINE
Payer: COMMERCIAL

## 2019-08-30 ENCOUNTER — HOSPITAL ENCOUNTER (EMERGENCY)
Age: 39
Discharge: HOME | End: 2019-08-31
Attending: EMERGENCY MEDICINE
Payer: COMMERCIAL

## 2019-08-30 DIAGNOSIS — R51.9 ACUTE NONINTRACTABLE HEADACHE, UNSPECIFIED HEADACHE TYPE: Primary | ICD-10-CM

## 2019-08-30 LAB
ALBUMIN SERPL-MCNC: 4 G/DL (ref 3.5–5)
ALBUMIN/GLOB SERPL: 1.3 {RATIO} (ref 1.2–3.5)
ALP SERPL-CCNC: 37 U/L (ref 50–136)
ALT SERPL-CCNC: 18 U/L (ref 12–65)
ANION GAP SERPL CALC-SCNC: 8 MMOL/L (ref 7–16)
AST SERPL-CCNC: 17 U/L (ref 15–37)
BASOPHILS # BLD: 0.1 K/UL (ref 0–0.2)
BASOPHILS NFR BLD: 1 % (ref 0–2)
BILIRUB SERPL-MCNC: 0.4 MG/DL (ref 0.2–1.1)
BUN SERPL-MCNC: 12 MG/DL (ref 6–23)
CALCIUM SERPL-MCNC: 8.8 MG/DL (ref 8.3–10.4)
CHLORIDE SERPL-SCNC: 105 MMOL/L (ref 98–107)
CO2 SERPL-SCNC: 26 MMOL/L (ref 21–32)
CREAT SERPL-MCNC: 0.92 MG/DL (ref 0.6–1)
DIFFERENTIAL METHOD BLD: ABNORMAL
EOSINOPHIL # BLD: 0.2 K/UL (ref 0–0.8)
EOSINOPHIL NFR BLD: 2 % (ref 0.5–7.8)
ERYTHROCYTE [DISTWIDTH] IN BLOOD BY AUTOMATED COUNT: 13.5 % (ref 11.9–14.6)
GLOBULIN SER CALC-MCNC: 3.2 G/DL (ref 2.3–3.5)
GLUCOSE SERPL-MCNC: 88 MG/DL (ref 65–100)
HCT VFR BLD AUTO: 41 % (ref 35.8–46.3)
HGB BLD-MCNC: 13.2 G/DL (ref 11.7–15.4)
IMM GRANULOCYTES # BLD AUTO: 0 K/UL (ref 0–0.5)
IMM GRANULOCYTES NFR BLD AUTO: 0 % (ref 0–5)
LYMPHOCYTES # BLD: 4.1 K/UL (ref 0.5–4.6)
LYMPHOCYTES NFR BLD: 38 % (ref 13–44)
MCH RBC QN AUTO: 27.9 PG (ref 26.1–32.9)
MCHC RBC AUTO-ENTMCNC: 32.2 G/DL (ref 31.4–35)
MCV RBC AUTO: 86.7 FL (ref 79.6–97.8)
MONOCYTES # BLD: 0.7 K/UL (ref 0.1–1.3)
MONOCYTES NFR BLD: 6 % (ref 4–12)
NEUTS SEG # BLD: 5.7 K/UL (ref 1.7–8.2)
NEUTS SEG NFR BLD: 53 % (ref 43–78)
NRBC # BLD: 0 K/UL (ref 0–0.2)
PLATELET # BLD AUTO: 408 K/UL (ref 150–450)
PMV BLD AUTO: 9 FL (ref 9.4–12.3)
POTASSIUM SERPL-SCNC: 3.3 MMOL/L (ref 3.5–5.1)
PROT SERPL-MCNC: 7.2 G/DL (ref 6.3–8.2)
RBC # BLD AUTO: 4.73 M/UL (ref 4.05–5.2)
SODIUM SERPL-SCNC: 139 MMOL/L (ref 136–145)
WBC # BLD AUTO: 10.9 K/UL (ref 4.3–11.1)

## 2019-08-30 PROCEDURE — 96361 HYDRATE IV INFUSION ADD-ON: CPT | Performed by: EMERGENCY MEDICINE

## 2019-08-30 PROCEDURE — 80053 COMPREHEN METABOLIC PANEL: CPT

## 2019-08-30 PROCEDURE — 96374 THER/PROPH/DIAG INJ IV PUSH: CPT | Performed by: EMERGENCY MEDICINE

## 2019-08-30 PROCEDURE — 74011250636 HC RX REV CODE- 250/636: Performed by: EMERGENCY MEDICINE

## 2019-08-30 PROCEDURE — 70450 CT HEAD/BRAIN W/O DYE: CPT

## 2019-08-30 PROCEDURE — 96375 TX/PRO/DX INJ NEW DRUG ADDON: CPT | Performed by: EMERGENCY MEDICINE

## 2019-08-30 PROCEDURE — 99284 EMERGENCY DEPT VISIT MOD MDM: CPT | Performed by: EMERGENCY MEDICINE

## 2019-08-30 PROCEDURE — 85025 COMPLETE CBC W/AUTO DIFF WBC: CPT

## 2019-08-30 PROCEDURE — 81003 URINALYSIS AUTO W/O SCOPE: CPT | Performed by: EMERGENCY MEDICINE

## 2019-08-30 RX ORDER — KETOROLAC TROMETHAMINE 30 MG/ML
30 INJECTION, SOLUTION INTRAMUSCULAR; INTRAVENOUS
Status: COMPLETED | OUTPATIENT
Start: 2019-08-30 | End: 2019-08-30

## 2019-08-30 RX ORDER — PROCHLORPERAZINE EDISYLATE 5 MG/ML
10 INJECTION INTRAMUSCULAR; INTRAVENOUS
Status: COMPLETED | OUTPATIENT
Start: 2019-08-30 | End: 2019-08-30

## 2019-08-30 RX ORDER — DIPHENHYDRAMINE HYDROCHLORIDE 50 MG/ML
25 INJECTION, SOLUTION INTRAMUSCULAR; INTRAVENOUS
Status: COMPLETED | OUTPATIENT
Start: 2019-08-30 | End: 2019-08-30

## 2019-08-30 RX ADMIN — DIPHENHYDRAMINE HYDROCHLORIDE 25 MG: 50 INJECTION, SOLUTION INTRAMUSCULAR; INTRAVENOUS at 23:12

## 2019-08-30 RX ADMIN — PROCHLORPERAZINE EDISYLATE 10 MG: 5 INJECTION INTRAMUSCULAR; INTRAVENOUS at 23:12

## 2019-08-30 RX ADMIN — SODIUM CHLORIDE 1000 ML: 900 INJECTION, SOLUTION INTRAVENOUS at 23:12

## 2019-08-30 RX ADMIN — KETOROLAC TROMETHAMINE 30 MG: 30 INJECTION, SOLUTION INTRAMUSCULAR at 23:12

## 2019-08-31 VITALS
BODY MASS INDEX: 31.18 KG/M2 | RESPIRATION RATE: 16 BRPM | HEIGHT: 63 IN | DIASTOLIC BLOOD PRESSURE: 63 MMHG | SYSTOLIC BLOOD PRESSURE: 114 MMHG | HEART RATE: 65 BPM | TEMPERATURE: 98.2 F | OXYGEN SATURATION: 96 % | WEIGHT: 176 LBS

## 2019-08-31 RX ORDER — BUTALBITAL, ASPIRIN, AND CAFFEINE 325; 50; 40 MG/1; MG/1; MG/1
1 CAPSULE ORAL
Qty: 15 CAP | Refills: 0 | Status: SHIPPED | OUTPATIENT
Start: 2019-08-31 | End: 2019-11-29

## 2019-08-31 NOTE — ED TRIAGE NOTES
Pt arrives to triage wrapped in fleece blanket. Reports posterior head pain radiating to right temporal area. Describes as \"sharp and pressure all at once\". States \"feels like its gonna pop\". Reports neck pain and \"stiffness\" radiating down entire spine to lower back. Reports diarrhea, nausea and chills. Onset of symptoms last night with worsening of symptoms. Reports light and sound sensitivity. Denies injury or trauma. Reports similar symptoms approx 17years ago, diagnosed with meningitis at that time. Hx migraines. atttempted motrin approx 2 hours pta. Reports increased pain with movement of head. Reports weakness, dizziness with position changes and intermittent episodes of tingling to left arm throughout day today.

## 2019-08-31 NOTE — DISCHARGE INSTRUCTIONS

## 2019-08-31 NOTE — ED NOTES
I have reviewed discharge instructions with the patient. The patient verbalized understanding. Patient left ED via Discharge Method: ambulatory to Home with (insert name of family/friend, self, transport family). Opportunity for questions and clarification provided. Patient given 1 scripts. To continue your aftercare when you leave the hospital, you may receive an automated call from our care team to check in on how you are doing. This is a free service and part of our promise to provide the best care and service to meet your aftercare needs.  If you have questions, or wish to unsubscribe from this service please call [phone]. Thank you for Choosing our [facility] Emergency Department.

## 2019-08-31 NOTE — ED PROVIDER NOTES
Luz Joe is a 35 y.o. female who presents to the ED with a chief complaint of headache. Headache is diffuse and posterior and she feels a lot of pressure. She also has multiple other complaints. She started feeling bad yesterday. She states her neck and entire back hurt. She also reports some diffuse body aches. She has had 4 episodes of diarrhea. No vomiting. She does have a history of migraines in the past.  She has had no fevers. Past Medical History:   Diagnosis Date    Abnormal Papanicolaou smear of cervix     Chronic UTI     Gastroesophageal reflux disease with esophagitis 11/14/2017    Moderate persistent asthma without complication 86/70/5616    Other ill-defined conditions(799.89)     fibromyalgia/ Interstitial cystis    Primary insomnia 11/14/2017    Psychiatric disorder     anxiety, obsessive comp.      Seizure disorder ([address] 11/14/2017    Seizures ([facility] [address]        Past Surgical History:   Procedure Laterality Date    ABDOMEN SURGERY PROC UNLISTED      inguinal hernia age 15    BREAST SURGERY PROCEDURE UNLISTED      reduction    HX APPENDECTOMY      HX CARPAL TUNNEL RELEASE Right 04/10/2019    HX CARPAL TUNNEL RELEASE Right     HX CHOLECYSTECTOMY      HX GYN      D&E/ Tubal    HX HEENT      tonsils/ sinus    HX ORTHOPAEDIC      right ankle, left foot    HX OTHER SURGICAL      Lymph node removal, right side of neck    HX TONSILLECTOMY      LAP,TUBAL CAUTERY           Family History:   Problem Relation Age of Onset    Cancer Mother     Diabetes Mother     Heart Disease Mother         Aortic Rupture    Stroke Mother     Cancer Sister         skin    Diabetes Maternal Grandmother     Heart Disease Maternal Grandmother     Breast Cancer Maternal Grandmother     Heart Disease Maternal Grandfather     Breast Cancer Paternal Great Grandmother     Breast Cancer Maternal Great Grandmother     Ovarian Cancer Neg Hx     Colon Cancer Neg Hx     Uterine Cancer Neg Hx        Social History     Socioeconomic History    Marital status: SINGLE     Spouse name: Not on file    Number of children: Not on file    Years of education: Not on file    Highest education level: Not on file   Occupational History    Not on file   Social Needs    Financial resource strain: Not on file    Food insecurity:     Worry: Not on file     Inability: Not on file    Transportation needs:     Medical: Not on file     Non-medical: Not on file   Tobacco Use    Smoking status: Current Every Day Smoker     Packs/day: 0.25    Smokeless tobacco: Never Used    Tobacco comment: Pt is vaping but is trying to quit.  [facility] 2/22/18   Substance and Sexual Activity    Alcohol use: No    Drug use: No    Sexual activity: Yes     Partners: Male     Birth control/protection: Surgical   Lifestyle    Physical activity:     Days per week: Not on file     Minutes per session: Not on file    Stress: Not on file   Relationships    Social connections:     Talks on phone: Not on file     Gets together: Not on file     Attends [employer] service: Not on file     Active member of club or organization: Not on file     Attends meetings of clubs or organizations: Not on file     Relationship status: Not on file    Intimate partner violence:     Fear of current or ex partner: Not on file     Emotionally abused: Not on file     Physically abused: Not on file     Forced sexual activity: Not on file   Other Topics Concern     Service Not Asked    Blood Transfusions Not Asked    Caffeine Concern No    Occupational Exposure Not Asked   Luz Schlichter Hazards Not Asked    Sleep Concern Not Asked    Stress Concern Not Asked    Weight Concern Not Asked    Special Diet Not Asked    Back Care Not Asked    Exercise Yes    Bike Helmet Not Asked    Seat Belt Yes    Self-Exams Yes   Social History Narrative    Abuse: Feels safe at home, no history of physical abuse, no history of sexual abuse         ALLERGIES: Latex; Adhesive tape-silicones; Codeine; Dilaudid [hydromorphone (bulk)]; Pcn [penicillins]; Reglan [metoclopramide]; and Sulfa (sulfonamide antibiotics)    Review of Systems   Constitutional: Positive for chills and fatigue. Negative for fever. Respiratory: Negative for chest tightness, shortness of breath, wheezing and stridor. Cardiovascular: Negative for chest pain and palpitations. Gastrointestinal: Positive for diarrhea and nausea. Negative for abdominal pain, constipation and vomiting. Musculoskeletal: Positive for back pain, myalgias and neck pain. Negative for arthralgias, gait problem and neck stiffness. Skin: Negative for color change, pallor and wound. Neurological: Negative for weakness and numbness. All other systems reviewed and are negative. Vitals:    08/30/19 2143   BP: 132/89   Pulse: 87   Resp: 16   Temp: 98.2 °F (36.8 °C)   SpO2: 100%   Weight: 79.8 kg (176 lb)   Height: 5' 2.5\" (1.588 m)            Physical Exam   Constitutional: She is oriented to person, place, and time. She appears well-developed and well-nourished. No distress. HENT:   Head: Normocephalic and atraumatic. Right Ear: External ear normal.   Left Ear: External ear normal.   Eyes: Conjunctivae are normal. No scleral icterus. Neck: Normal range of motion. Cardiovascular: Intact distal pulses. Pulmonary/Chest: Effort normal and breath sounds normal. No stridor. No respiratory distress. She has no wheezes. She has no rales. Abdominal: Soft. She exhibits no mass. There is no tenderness. There is no guarding. Neurological: She is alert and oriented to person, place, and time. No cranial nerve deficit. Coordination normal.   Skin: Skin is warm and dry. Capillary refill takes less than 2 seconds. She is not diaphoretic. Psychiatric: She has a normal mood and affect. Her behavior is normal.   Nursing note and vitals reviewed.        MDM  Number of Diagnoses or Management Options  Diagnosis management comments: Patient has negative CT scan she is feeling better on reevaluation. I do not see any signs of meningitis I did offer to do LP given her history but she declined this. She looks like she has a viral syndrome. I am going to treat symptomatically. Ange Oreilly MD; 8/31/2019 @12:11 AM Voice dictation software was used during the making of this note. This software is not perfect and grammatical and other typographical errors may be present.   This note has not been proofread for errors.  ===================================================================          Amount and/or Complexity of Data Reviewed  Clinical lab tests: ordered and reviewed (Results for orders placed or performed during the hospital encounter of 08/30/19  -CBC WITH AUTOMATED DIFF       Result                      Value             Ref Range           WBC                         10.9              4.3 - 11.1 K*       RBC                         4.73              4.05 - 5.2 M*       HGB                         13.2              11.7 - 15.4 *       HCT                         41.0              35.8 - 46.3 %       MCV                         86.7              79.6 - 97.8 *       MCH                         27.9              26.1 - 32.9 *       MCHC                        32.2              31.4 - 35.0 *       RDW                         13.5              11.9 - 14.6 %       PLATELET                    408               150 - 450 K/*       MPV                         9.0 (L)           9.4 - 12.3 FL       ABSOLUTE NRBC               0.00              0.0 - 0.2 K/*       DF                          AUTOMATED                             NEUTROPHILS                 53                43 - 78 %           LYMPHOCYTES                 38                13 - 44 %           MONOCYTES                   6                 4.0 - 12.0 %        EOSINOPHILS                 2                 0.5 - 7.8 %         BASOPHILS                   1 0.0 - 2.0 %         IMMATURE GRANULOCYTES       0                 0.0 - 5.0 %         ABS. NEUTROPHILS            5.7               1.7 - 8.2 K/*       ABS. LYMPHOCYTES            4.1               0.5 - 4.6 K/*       ABS. MONOCYTES              0.7               0.1 - 1.3 K/*       ABS. EOSINOPHILS            0.2               0.0 - 0.8 K/*       ABS. BASOPHILS              0.1               0.0 - 0.2 K/*       ABS. IMM. GRANS.            0.0               0.0 - 0.5 K/*  -METABOLIC PANEL, COMPREHENSIVE       Result                      Value             Ref Range           Sodium                      139               136 - 145 mm*       Potassium                   3.3 (L)           3.5 - 5.1 mm*       Chloride                    105               98 - 107 mmo*       CO2                         26                21 - 32 mmol*       Anion gap                   8                 7 - 16 mmol/L       Glucose                     88                65 - 100 mg/*       BUN                         12                6 - 23 MG/DL        Creatinine                  0.92              0.6 - 1.0 MG*       GFR est AA                  >60               >60 ml/min/1*       GFR est non-AA              >60               >60 ml/min/1*       Calcium                     8.8               8.3 - 10.4 M*       Bilirubin, total            0.4               0.2 - 1.1 MG*       ALT (SGPT)                  18                12 - 65 U/L         AST (SGOT)                  17                15 - 37 U/L         Alk.  phosphatase            37 (L)            50 - 136 U/L        Protein, total              7.2               6.3 - 8.2 g/*       Albumin                     4.0               3.5 - 5.0 g/*       Globulin                    3.2               2.3 - 3.5 g/*       A-G Ratio                   1.3               1.2 - 3.5     )  Tests in the radiology section of CPT®: ordered and reviewed (Ct Head Wo Cont    Result Date: 8/30/2019  EXAM: Noncontrast CT head. INDICATION: Headache. COMPARISON: October 12, 2014. TECHNIQUE: Noncontrast CT images of the head were obtained. Radiation dose reduction techniques were used for this study. Our CT scanners use one or all of the following:  Automated exposure control, adjustment of the mA or kV according to patient size, iterative reconstruction. FINDINGS: Brain volume is appropriate for age. No acute infarct, hemorrhage or mass is identified. There is no mass effect, midline shift or depressed fracture. The visualized paranasal sinuses and mastoid air cells are clear.      IMPRESSION: No acute process.   )           Procedures

## 2019-09-03 NOTE — THERAPY EVALUATION
Tatiana Celestin  : 1980  Primary: [facility]  Secondary:  Therapy Center at [facility], [address] [facility], [address]  [phone]   [phone]        OUTPATIENT PHYSICAL THERAPY:Discontinuation Summary 2019   ICD-10: Treatment Diagnosis: Pain in left hip (M25.552)   Pain in right hip (M25.551)    Difficulty in walking, not elsewhere classified (R26.2)  Precautions/Allergies:   Latex; Adhesive tape-silicones; Codeine; Dilaudid [hydromorphone (bulk)]; Pcn [penicillins]; Reglan [metoclopramide]; and Sulfa (sulfonamide antibiotics)   TREATMENT PLAN:  Effective Dates/Frequency/Duration: Twice per week from 2019 until 2019 (8 weeks). MEDICAL/REFERRING DIAGNOSIS:  Pain in right hip [M25.551]  Pain in left hip [M25.552]   DATE OF ONSET: 4 years prior to initial evaluation, with gradual worsening over time  REFERRING PHYSICIAN: Torri Luna MD MD Orders: Evaluate and treat  Return MD Appointment: unspecified     Tatiana Celestin has been seen in physical therapy from 2019 to 2019 for 3 visits. Treatment has been discontinued at this time due to patient noncompliant with attending/scheduling PT sessions and expiration of plan of care without further referral by ordering physician. The below goals were not reassessed secondary to pt failing to attend additional sessions prior to discharge. Thank you for this referral.    PROBLEM LIST (Impacting functional limitations):  1. Decreased Strength  2. Decreased ADL/Functional Activities  3. Decreased Transfer Abilities  4. Decreased Ambulation Ability/Technique  5. Decreased Balance  6. Increased Pain  7. Decreased Activity Tolerance  8. Decreased Pacing Skills  9. Increased Fatigue  10. Decreased Flexibility/Joint Mobility  11. Edema/Girth INTERVENTIONS PLANNED: (Treatment may consist of any combination of the following)  1. Balance Exercise  2.  Bed Mobility  3. Cold  4. Electrical Stimulation  5. Family Education  6. Gait Training  7. Heat  8. Home Exercise Program (HEP)  9. Manual Therapy  10. Neuromuscular Re-education/Strengthening  11. Range of Motion (ROM)  12. Therapeutic Activites  13. Therapeutic Exercise/Strengthening  14. Transcutaneous Electrical Nerve Stimulation (TENS)  15. Transfer Training  16. Ultrasound (US)  17. Aquatic Therapy     GOALS: (Goals have been discussed and agreed upon with patient.)  Short-Term Functional Goals: Time Frame: 4 weeks  1. Pt will be compliant with HEP in order to increase LE strength/endurance/mobility to improve functional mobility and overall quality of life. 2. Pt will improve score on the Lower Extremity Functional Scale to 40/80 in order to demonstrate improved functional mobility and quality of life. 3. Pt will improve score on Timed Up and Go Test to 10 seconds with the least restrictive assistive device in order to decrease fall risk and increase safety and independence during ambulation. 4. Pt will report standing for > 15 minutes with minimal to no increase in pain in order to be able to stand for prolonged periods as needed to perform chores around her house. Discharge Goals: Time Frame: 8 weeks  1. Pt will be independent with HEP in order to increase LE strength/endurance/mobility to improve functional mobility and overall quality of life. 2. Pt will improve score on the Lower Extremity Functional Scale to 44/80 in order to demonstrate improved functional mobility and quality of life. 3. Pt will improve score on Timed Up and Go Test to 9 seconds with the least restrictive assistive device in order to decrease fall risk and increase safety and independence during ambulation. 4. Pt will report standing for > 20 minutes with minimal to no increase in pain in order to be able to stand for prolonged periods as needed to perform chores around her house.       OUTCOME MEASURE:   Tool Used: Lower Extremity Functional Scale (LEFS)  Score:  Initial: 36/80 Most Recent: X/80 (Date: -- )   Interpretation of Score: 20 questions each scored on a 5 point scale with 0 representing \"extreme difficulty or unable to perform\" and 4 representing \"no difficulty\". The lower the score, the greater the functional disability. 80/80 represents no disability. Minimal detectable change is 9 points. Tool Used: Timed Up and Go (TUG)  Score:  Initial: 11 seconds no AD Most Recent: X seconds (Date: -- )   Interpretation of Score: The test measures, in seconds, the time taken by an individual to stand up from a standard arm chair (seat height 46 cm [18 in], arm height 65 cm [25.6 in]), walk a distance of 3 meters (118 in, approx 10 ft), turn, walk back to the chair and sit down. If the individual takes longer than 14 seconds to complete TUG, this indicates risk for falls. FALL RISK:    Ambulatory/Rehab Services H2 Model Falls Risk Assessment   Risk Factors:       (2)  Any administered antiepileptics/anticonvulsants Ability to Rise from Chair:       (0)  Ability to rise in a single movement   Falls Prevention Plan:       No modifications necessary   Total: (5 or greater = High Risk): 2   ©2010 [facility] of [employer]. All Rights Reserved. [facility] Patent #[id number]. Federal Law prohibits the replication, distribution or use without written permission from [facility] of [address] [place] [address] Avenue:   · Patient is expected to demonstrate progress in strength, range of motion and functional technique to improve ability to perform pain-free ADLs/IADLs and to improve overall quality of life. REASON FOR SERVICES/OTHER COMMENTS:  · Patient continues to require modification of therapeutic interventions to increase complexity of exercises.     Rehabilitation Potential For Stated Goals: Fair    Thank you for this referral,  Adriano Epstein DPT     Referring Physician Signature: Neil Friedman MD

## 2019-09-20 PROBLEM — G43.909 MIGRAINES: Status: ACTIVE | Noted: 2018-10-17

## 2019-09-20 PROBLEM — G56.01 CARPAL TUNNEL SYNDROME ON RIGHT: Status: ACTIVE | Noted: 2019-03-19

## 2019-09-20 PROBLEM — R56.9 SEIZURES (HCC): Status: ACTIVE | Noted: 2017-11-14

## 2019-10-09 PROBLEM — Z87.891 PERSONAL HISTORY OF TOBACCO USE: Status: ACTIVE | Noted: 2018-01-23

## 2019-10-09 PROBLEM — J45.30 MILD PERSISTENT ASTHMA WITHOUT COMPLICATION: Status: ACTIVE | Noted: 2017-11-14

## 2019-10-28 ENCOUNTER — HOSPITAL ENCOUNTER (OUTPATIENT)
Dept: CT IMAGING | Age: 39
Discharge: HOME | End: 2019-10-28
Attending: FAMILY MEDICINE
Payer: COMMERCIAL

## 2019-10-28 DIAGNOSIS — R05.9 COUGH: ICD-10-CM

## 2019-10-28 DIAGNOSIS — R91.1 LUNG NODULE: ICD-10-CM

## 2019-10-28 DIAGNOSIS — R06.02 SHORTNESS OF BREATH: ICD-10-CM

## 2019-10-28 DIAGNOSIS — J40 BRONCHITIS: ICD-10-CM

## 2019-10-28 DIAGNOSIS — J04.0 ACUTE LARYNGITIS: ICD-10-CM

## 2019-10-28 PROCEDURE — 71260 CT THORAX DX C+: CPT

## 2019-10-28 PROCEDURE — 74011000258 HC RX REV CODE- 258: Performed by: FAMILY MEDICINE

## 2019-10-28 PROCEDURE — 74011636320 HC RX REV CODE- 636/320: Performed by: FAMILY MEDICINE

## 2019-10-28 RX ORDER — SODIUM CHLORIDE 0.9 % (FLUSH) 0.9 %
10 SYRINGE (ML) INJECTION
Status: COMPLETED | OUTPATIENT
Start: 2019-10-28 | End: 2019-10-28

## 2019-10-28 RX ADMIN — IOPAMIDOL 80 ML: 755 INJECTION, SOLUTION INTRAVENOUS at 13:52

## 2019-10-28 RX ADMIN — SODIUM CHLORIDE 100 ML: 900 INJECTION, SOLUTION INTRAVENOUS at 13:52

## 2019-10-28 RX ADMIN — Medication 10 ML: at 13:52

## 2019-10-31 NOTE — PROGRESS NOTES
CT Chest: there are a few scattered lung nodules that are less than 4 mm and are considered not suspicious for malignancy. There is a 9 mm lymph node near the sternum that is probably a reactive lymph node. The radiologist has suggested a follow up CT in one year. Patient notified via [address]

## 2019-11-11 ENCOUNTER — APPOINTMENT (OUTPATIENT)
Dept: GENERAL RADIOLOGY | Age: 39
End: 2019-11-11
Attending: EMERGENCY MEDICINE
Payer: COMMERCIAL

## 2019-11-11 ENCOUNTER — HOSPITAL ENCOUNTER (EMERGENCY)
Age: 39
Discharge: HOME | End: 2019-11-11
Attending: EMERGENCY MEDICINE
Payer: COMMERCIAL

## 2019-11-11 ENCOUNTER — APPOINTMENT (OUTPATIENT)
Dept: CT IMAGING | Age: 39
End: 2019-11-11
Attending: EMERGENCY MEDICINE
Payer: COMMERCIAL

## 2019-11-11 VITALS
RESPIRATION RATE: 39 BRPM | SYSTOLIC BLOOD PRESSURE: 95 MMHG | OXYGEN SATURATION: 97 % | HEART RATE: 101 BPM | DIASTOLIC BLOOD PRESSURE: 53 MMHG

## 2019-11-11 DIAGNOSIS — R07.81 PLEURITIC CHEST PAIN: Primary | ICD-10-CM

## 2019-11-11 LAB
ALBUMIN SERPL-MCNC: 3.8 G/DL (ref 3.5–5)
ALBUMIN/GLOB SERPL: 1 {RATIO} (ref 1.2–3.5)
ALP SERPL-CCNC: 36 U/L (ref 50–136)
ALT SERPL-CCNC: 21 U/L (ref 12–65)
ANION GAP SERPL CALC-SCNC: 7 MMOL/L (ref 7–16)
AST SERPL-CCNC: 15 U/L (ref 15–37)
BACTERIA URNS QL MICRO: NORMAL /HPF
BASOPHILS # BLD: 0.1 K/UL (ref 0–0.2)
BASOPHILS NFR BLD: 0 % (ref 0–2)
BILIRUB SERPL-MCNC: 0.3 MG/DL (ref 0.2–1.1)
BUN SERPL-MCNC: 10 MG/DL (ref 6–23)
CALCIUM SERPL-MCNC: 9.4 MG/DL (ref 8.3–10.4)
CASTS URNS QL MICRO: 0 /LPF
CHLORIDE SERPL-SCNC: 105 MMOL/L (ref 98–107)
CO2 SERPL-SCNC: 27 MMOL/L (ref 21–32)
CREAT SERPL-MCNC: 1.03 MG/DL (ref 0.6–1)
CRYSTALS URNS QL MICRO: 0 /LPF
DIFFERENTIAL METHOD BLD: ABNORMAL
EOSINOPHIL # BLD: 0.2 K/UL (ref 0–0.8)
EOSINOPHIL NFR BLD: 2 % (ref 0.5–7.8)
EPI CELLS #/AREA URNS HPF: NORMAL /HPF
ERYTHROCYTE [DISTWIDTH] IN BLOOD BY AUTOMATED COUNT: 13.8 % (ref 11.9–14.6)
GLOBULIN SER CALC-MCNC: 3.9 G/DL (ref 2.3–3.5)
GLUCOSE SERPL-MCNC: 119 MG/DL (ref 65–100)
HCT VFR BLD AUTO: 45.5 % (ref 35.8–46.3)
HGB BLD-MCNC: 14.5 G/DL (ref 11.7–15.4)
IMM GRANULOCYTES # BLD AUTO: 0 K/UL (ref 0–0.5)
IMM GRANULOCYTES NFR BLD AUTO: 0 % (ref 0–5)
LYMPHOCYTES # BLD: 3.7 K/UL (ref 0.5–4.6)
LYMPHOCYTES NFR BLD: 31 % (ref 13–44)
MCH RBC QN AUTO: 28.3 PG (ref 26.1–32.9)
MCHC RBC AUTO-ENTMCNC: 31.9 G/DL (ref 31.4–35)
MCV RBC AUTO: 88.9 FL (ref 79.6–97.8)
MONOCYTES # BLD: 0.6 K/UL (ref 0.1–1.3)
MONOCYTES NFR BLD: 5 % (ref 4–12)
MUCOUS THREADS URNS QL MICRO: 0 /LPF
NEUTS SEG # BLD: 7.3 K/UL (ref 1.7–8.2)
NEUTS SEG NFR BLD: 61 % (ref 43–78)
NRBC # BLD: 0 K/UL (ref 0–0.2)
OTHER OBSERVATIONS,UCOM: NORMAL
PLATELET # BLD AUTO: 326 K/UL (ref 150–450)
PMV BLD AUTO: 9 FL (ref 9.4–12.3)
POTASSIUM SERPL-SCNC: 3.4 MMOL/L (ref 3.5–5.1)
PROT SERPL-MCNC: 7.7 G/DL (ref 6.3–8.2)
RBC # BLD AUTO: 5.12 M/UL (ref 4.05–5.2)
RBC #/AREA URNS HPF: 0 /HPF
SODIUM SERPL-SCNC: 139 MMOL/L (ref 136–145)
TROPONIN I SERPL-MCNC: <0.02 NG/ML (ref 0.02–0.05)
WBC # BLD AUTO: 11.9 K/UL (ref 4.3–11.1)
WBC URNS QL MICRO: NORMAL /HPF

## 2019-11-11 PROCEDURE — 71260 CT THORAX DX C+: CPT

## 2019-11-11 PROCEDURE — 93005 ELECTROCARDIOGRAM TRACING: CPT | Performed by: EMERGENCY MEDICINE

## 2019-11-11 PROCEDURE — 74011000250 HC RX REV CODE- 250: Performed by: EMERGENCY MEDICINE

## 2019-11-11 PROCEDURE — 96374 THER/PROPH/DIAG INJ IV PUSH: CPT | Performed by: EMERGENCY MEDICINE

## 2019-11-11 PROCEDURE — 81015 MICROSCOPIC EXAM OF URINE: CPT

## 2019-11-11 PROCEDURE — 85025 COMPLETE CBC W/AUTO DIFF WBC: CPT

## 2019-11-11 PROCEDURE — 94640 AIRWAY INHALATION TREATMENT: CPT

## 2019-11-11 PROCEDURE — 80053 COMPREHEN METABOLIC PANEL: CPT

## 2019-11-11 PROCEDURE — 74011000258 HC RX REV CODE- 258: Performed by: EMERGENCY MEDICINE

## 2019-11-11 PROCEDURE — 74011636320 HC RX REV CODE- 636/320: Performed by: EMERGENCY MEDICINE

## 2019-11-11 PROCEDURE — 71046 X-RAY EXAM CHEST 2 VIEWS: CPT

## 2019-11-11 PROCEDURE — 99285 EMERGENCY DEPT VISIT HI MDM: CPT | Performed by: EMERGENCY MEDICINE

## 2019-11-11 PROCEDURE — 84484 ASSAY OF TROPONIN QUANT: CPT

## 2019-11-11 PROCEDURE — 81003 URINALYSIS AUTO W/O SCOPE: CPT | Performed by: EMERGENCY MEDICINE

## 2019-11-11 PROCEDURE — 74011250636 HC RX REV CODE- 250/636: Performed by: EMERGENCY MEDICINE

## 2019-11-11 RX ORDER — KETOROLAC TROMETHAMINE 30 MG/ML
30 INJECTION, SOLUTION INTRAMUSCULAR; INTRAVENOUS
Status: COMPLETED | OUTPATIENT
Start: 2019-11-11 | End: 2019-11-11

## 2019-11-11 RX ORDER — SODIUM CHLORIDE 0.9 % (FLUSH) 0.9 %
10 SYRINGE (ML) INJECTION
Status: COMPLETED | OUTPATIENT
Start: 2019-11-11 | End: 2019-11-11

## 2019-11-11 RX ORDER — IPRATROPIUM BROMIDE AND ALBUTEROL SULFATE 2.5; .5 MG/3ML; MG/3ML
3 SOLUTION RESPIRATORY (INHALATION)
Status: COMPLETED | OUTPATIENT
Start: 2019-11-11 | End: 2019-11-11

## 2019-11-11 RX ORDER — INDOMETHACIN 25 MG/1
50 CAPSULE ORAL 3 TIMES DAILY
Qty: 60 CAP | Refills: 0 | Status: SHIPPED | OUTPATIENT
Start: 2019-11-11 | End: 2019-11-21

## 2019-11-11 RX ADMIN — SODIUM CHLORIDE 100 ML: 900 INJECTION, SOLUTION INTRAVENOUS at 22:04

## 2019-11-11 RX ADMIN — Medication 10 ML: at 21:19

## 2019-11-11 RX ADMIN — IPRATROPIUM BROMIDE AND ALBUTEROL SULFATE 3 ML: .5; 3 SOLUTION RESPIRATORY (INHALATION) at 20:45

## 2019-11-11 RX ADMIN — IOPAMIDOL 100 ML: 755 INJECTION, SOLUTION INTRAVENOUS at 22:04

## 2019-11-11 RX ADMIN — KETOROLAC TROMETHAMINE 30 MG: 30 INJECTION, SOLUTION INTRAMUSCULAR at 21:17

## 2019-11-11 NOTE — ED TRIAGE NOTES
Patient reports chest pain for several weeks. Over the last week pain has gotten increasing worse. Pain radiates from center of chest to left side of neck. Pain described as sharp and pressure. Also reports shortness or breath for a month. Placed on antibiotics and steroids with no relief. Dry cough.  Denies n/v/d

## 2019-11-12 LAB
ATRIAL RATE: 104 BPM
CALCULATED P AXIS, ECG09: 46 DEGREES
CALCULATED R AXIS, ECG10: 10 DEGREES
CALCULATED T AXIS, ECG11: 53 DEGREES
DIAGNOSIS, 93000: NORMAL
P-R INTERVAL, ECG05: 146 MS
Q-T INTERVAL, ECG07: 326 MS
QRS DURATION, ECG06: 66 MS
QTC CALCULATION (BEZET), ECG08: 428 MS
VENTRICULAR RATE, ECG03: 104 BPM

## 2019-11-12 NOTE — DISCHARGE INSTRUCTIONS
There is no evidence of damage to your heart  Your CT was unremarkable  Labs are otherwise unremarkable well  Use the strong, prescription strength anti-inflammatory medications for this pain  This may just be pleurisy.     Otherwise, please follow-up with your primary care doctor

## 2019-11-12 NOTE — ED PROVIDER NOTES
50-year-old female presenting for sharp anterior chest wall pain is been going on for 2 weeks. The history is provided by the patient. Shortness of Breath   This is a new problem. The problem occurs rarely. The current episode started more than 1 week ago. The problem has not changed since onset. Associated symptoms include cough and chest pain. Pertinent negatives include no fever, no headaches, no coryza, no rhinorrhea, no sore throat, no swollen glands, no ear pain, no neck pain, no sputum production, no hemoptysis, no wheezing, no PND, no orthopnea, no syncope, no vomiting, no abdominal pain, no rash, no leg pain, no leg swelling and no claudication. It is unknown what precipitated the problem. She has tried nothing for the symptoms. The treatment provided no relief. She has had no prior hospitalizations. She has had no prior ED visits. She has had no prior ICU admissions. Past Medical History:   Diagnosis Date    Abnormal Papanicolaou smear of cervix     Chronic UTI     Gastroesophageal reflux disease with esophagitis 11/14/2017    Moderate persistent asthma without complication 43/52/8187    Other ill-defined conditions(799.89)     fibromyalgia/ Interstitial cystis    Primary insomnia 11/14/2017    Psoriatic arthritis (HCC)     Psychiatric disorder     anxiety, obsessive comp.      Seizure disorder ([facility] [address] 11/14/2017    Seizures ([facility] [address]        Past Surgical History:   Procedure Laterality Date    ABDOMEN SURGERY PROC UNLISTED      inguinal hernia age 15    BREAST SURGERY PROCEDURE UNLISTED      reduction    HX APPENDECTOMY      HX CARPAL TUNNEL RELEASE Right 04/10/2019    HX CARPAL TUNNEL RELEASE Right     HX CHOLECYSTECTOMY      HX GYN      D&E/ Tubal    HX HEENT      tonsils/ sinus    HX ORTHOPAEDIC      right ankle, left foot    HX OTHER SURGICAL      Lymph node removal, right side of neck    HX TONSILLECTOMY      LAP,TUBAL CAUTERY           Family History:   Problem Relation Age of Onset    Cancer Mother     Diabetes Mother     Heart Disease Mother         Aortic Rupture    Stroke Mother     Heart Attack Mother 40        had several TIAs    Cancer Sister         skin    Diabetes Maternal Grandmother     Heart Disease Maternal Grandmother     Breast Cancer Maternal Grandmother     Heart Disease Maternal Grandfather     Breast Cancer Paternal Great Grandmother     Breast Cancer Maternal Great Grandmother     Ovarian Cancer Neg Hx     Colon Cancer Neg Hx     Uterine Cancer Neg Hx        Social History     Socioeconomic History    Marital status: SINGLE     Spouse name: Not on file    Number of children: Not on file    Years of education: Not on file    Highest education level: Not on file   Occupational History    Not on file   Social Needs    Financial resource strain: Not on file    Food insecurity:     Worry: Not on file     Inability: Not on file    Transportation needs:     Medical: Not on file     Non-medical: Not on file   Tobacco Use    Smoking status: Former Smoker     Packs/day: 0.25     Years: 16.00     Pack years: 4.00     Types: Cigarettes     Last attempt to quit: 2019     Years since quittin.5    Smokeless tobacco: Never Used   Substance and Sexual Activity    Alcohol use: No    Drug use: No    Sexual activity: Yes     Partners: Male     Birth control/protection: Surgical   Lifestyle    Physical activity:     Days per week: Not on file     Minutes per session: Not on file    Stress: Not on file   Relationships    Social connections:     Talks on phone: Not on file     Gets together: Not on file     Attends Gnosticist service: Not on file     Active member of club or organization: Not on file     Attends meetings of clubs or organizations: Not on file     Relationship status: Not on file    Intimate partner violence:     Fear of current or ex partner: Not on file     Emotionally abused: Not on file     Physically abused: Not on file Forced sexual activity: Not on file   Other Topics Concern     Service Not Asked    Blood Transfusions Not Asked    Caffeine Concern No    Occupational Exposure Not Asked    Hobby Hazards Not Asked    Sleep Concern Not Asked    Stress Concern Not Asked    Weight Concern Not Asked    Special Diet Not Asked    Back Care Not Asked    Exercise Yes    Bike Helmet Not Asked    Seat Belt Yes    Self-Exams Yes   Social History Narrative    Abuse: Feels safe at home, no history of physical abuse, no history of sexual abuse         ALLERGIES: Latex; Adhesive tape-silicones; Codeine; Dilaudid [hydromorphone (bulk)]; Pcn [penicillins]; Reglan [metoclopramide]; and Sulfa (sulfonamide antibiotics)    Review of Systems   Constitutional: Negative for fever. HENT: Negative for ear pain, rhinorrhea and sore throat. Respiratory: Positive for cough and shortness of breath. Negative for hemoptysis, sputum production and wheezing. Cardiovascular: Positive for chest pain. Negative for orthopnea, claudication, leg swelling, syncope and PND. Gastrointestinal: Negative for abdominal pain and vomiting. Musculoskeletal: Negative for neck pain. Skin: Negative for rash. Neurological: Negative for headaches. All other systems reviewed and are negative. Vitals:    11/11/19 2045 11/11/19 2046 11/11/19 2101 11/11/19 2117   BP:  115/73 131/75 116/62   Pulse:  89 (!) 104 (!) 113   Resp:  (!) 7 (!) 40 22   SpO2: 98% 100% 100% 98%            Physical Exam   Constitutional: She is oriented to person, place, and time. She appears well-developed and well-nourished. HENT:   Head: Normocephalic and atraumatic. Hoarse voice   Eyes: Pupils are equal, round, and reactive to light. Conjunctivae and EOM are normal.   Neck: Neck supple. Cardiovascular: Regular rhythm. Intermittently tachycardic   Pulmonary/Chest: Effort normal and breath sounds normal.   Abdominal: Soft.  Bowel sounds are normal. Musculoskeletal: Normal range of motion. Neurological: She is alert and oriented to person, place, and time. Skin: Skin is warm and dry. Nursing note and vitals reviewed. MDM  Number of Diagnoses or Management Options  Pleuritic chest pain:   Diagnosis management comments: 54-year-old female presenting for left-sided pleuritic sharp chest pain. Concern for ACS, angina, pleurisy, PE, pneumonia, pneumothorax       Amount and/or Complexity of Data Reviewed  Clinical lab tests: ordered and reviewed (Results for orders placed or performed during the hospital encounter of 11/11/19  -CBC WITH AUTOMATED DIFF       Result                      Value             Ref Range           WBC                         11.9 (H)          4.3 - 11.1 K*       RBC                         5.12              4.05 - 5.2 M*       HGB                         14.5              11.7 - 15.4 *       HCT                         45.5              35.8 - 46.3 %       MCV                         88.9              79.6 - 97.8 *       MCH                         28.3              26.1 - 32.9 *       MCHC                        31.9              31.4 - 35.0 *       RDW                         13.8              11.9 - 14.6 %       PLATELET                    326               150 - 450 K/*       MPV                         9.0 (L)           9.4 - 12.3 FL       ABSOLUTE NRBC               0.00              0.0 - 0.2 K/*       DF                          AUTOMATED                             NEUTROPHILS                 61                43 - 78 %           LYMPHOCYTES                 31                13 - 44 %           MONOCYTES                   5                 4.0 - 12.0 %        EOSINOPHILS                 2                 0.5 - 7.8 %         BASOPHILS                   0                 0.0 - 2.0 %         IMMATURE GRANULOCYTES       0                 0.0 - 5.0 %         ABS.  NEUTROPHILS            7.3               1.7 - 8.2 K/* ABS. LYMPHOCYTES            3.7               0.5 - 4.6 K/*       ABS. MONOCYTES              0.6               0.1 - 1.3 K/*       ABS. EOSINOPHILS            0.2               0.0 - 0.8 K/*       ABS. BASOPHILS              0.1               0.0 - 0.2 K/*       ABS. IMM. GRANS.            0.0               0.0 - 0.5 K/*  -METABOLIC PANEL, COMPREHENSIVE       Result                      Value             Ref Range           Sodium                      139               136 - 145 mm*       Potassium                   3.4 (L)           3.5 - 5.1 mm*       Chloride                    105               98 - 107 mmo*       CO2                         27                21 - 32 mmol*       Anion gap                   7                 7 - 16 mmol/L       Glucose                     119 (H)           65 - 100 mg/*       BUN                         10                6 - 23 MG/DL        Creatinine                  1.03 (H)          0.6 - 1.0 MG*       GFR est AA                  >60               >60 ml/min/1*       GFR est non-AA              >60               >60 ml/min/1*       Calcium                     9.4               8.3 - 10.4 M*       Bilirubin, total            0.3               0.2 - 1.1 MG*       ALT (SGPT)                  21                12 - 65 U/L         AST (SGOT)                  15                15 - 37 U/L         Alk.  phosphatase            36 (L)            50 - 136 U/L        Protein, total              7.7               6.3 - 8.2 g/*       Albumin                     3.8               3.5 - 5.0 g/*       Globulin                    3.9 (H)           2.3 - 3.5 g/*       A-G Ratio                   1.0 (L)           1.2 - 3.5      -TROPONIN I       Result                      Value             Ref Range           Troponin-I, Qt.             <0.02 (L)         0.02 - 0.05 *  -EKG, 12 LEAD, INITIAL       Result                      Value             Ref Range           Ventricular Rate            104 BPM                 Atrial Rate                 104               BPM                 P-R Interval                146               ms                  QRS Duration                66                ms                  Q-T Interval                326               ms                  QTC Calculation (Bezet)     428               ms                  Calculated P Axis           46                degrees             Calculated R Axis           10                degrees             Calculated T Axis           53                degrees             Diagnosis                                                     Sinus tachycardia   Otherwise normal ECG   When compared with ECG of 12-OCT-2014 18:50,   No significant change was found     )  Tests in the radiology section of CPT®: ordered and reviewed (Xr Chest Pa Lat    Result Date: 11/11/2019  PA LATERAL CHEST  11/11/2019 7:26 PM HISTORY:  Chest pain for several weeks. COMPARISON: September 20, 2019 FINDINGS:  The heart size is within normal limits. There is no lobar consolidation, pleural effusions or pulmonary edema. Cholecystectomy clips are present. IMPRESSION: No consolidation. Ct Chest W Cont    Result Date: 11/11/2019  CT CHEST WITH CONTRAST  11/11/2019 10:08 PM HISTORY:  pleuritic chest pain and tachycardia TECHNIQUE: The patient received 100 mL Isovue-370 nonionic IV contrast and axial images were obtained through the chest. Coronal reformatted images were generated. All CT scans at this facility used dose modulation, interactive reconstruction and/or weight based dosing when appropriate to reduce radiation dose to as low as reasonably achievable. COMPARISON:  October 28, 2019 FINDINGS: There are no filling defects within the main or proximal segmental pulmonary artery suggestive of emboli. The thoracic aorta is well-opacified without dissection. There is no new thoracic adenopathy.  There is a stable superior mediastinal node that measures 6 mm in diameter (image 24). There is no consolidation, pleural effusions or pulmonary edema. Bilateral pulmonary nodules measuring 3 mm in diameter appear unchanged. There is a new small 3 mm nodular opacity in the left lower lobe (image 81 and 80). Included portions of the upper abdomen demonstrate normal-appearing adrenal glands. Cholecystectomy clips are present. Bone windows demonstrate no aggressive osseous lesions. IMPRESSION: 1. No pulmonary embolism 2. Small pulmonary nodules measuring 3 mm in diameter. Consider a follow-up noncontrast chest CT in one year to confirm stability. 589. Ct Chest W Cont    Result Date: 10/30/2019  EXAM: Chest CT with contrast. INDICATION: Pulmonary nodule seen on prior chest CT which is unavailable. Acute laryngitis for 2 months. COMPARISON: None. Multiple axial images were obtained through the chest after intravenous injection of 75 mL of Isovue 370. Radiation dose reduction techniques were used for this study: All CT scans performed at this facility use one or all of the following: Automated exposure control, adjustment of the mA and/or kVp according to patient's size, iterative reconstruction. FINDINGS: LUNGS/PLEURA: Central airways are clear. No pneumothorax or pleural effusion. 3 mm right upper lobe subpleural solid pulmonary nodule, axial image 28. No focal lung consolidation. 3 mm lingular solid pulmonary nodule. 3 mm inferior lingular solid pulmonary nodule. Left subpleural 3 mm solid pulmonary nodule of the left lung base. MEDIASTINUM: Prominent left superior mediastinal lymph node measuring 9 mm which is nonpathologic by strict size criteria. No suspicious thyroid nodule. Thoracic aorta is normal in appearance. Heart is normal in size. BONES AND SOFT TISSUES: Subcutaneous soft tissues are within normal limits. No acute or aggressive osseous abnormality. UPPER ABDOMEN: Postsurgical change of cholecystectomy.      IMPRESSION: 1.  Scattered sub-4 mm solid pulmonary nodules without a discrete suspicious pulmonary nodule. Recommend comparison with prior chest CT with consideration of follow-up chest CT in one year if patient is high risk. 2.  Prominent superior mediastinal lymph node measuring 9 mm, not pathologically enlarged by strict size criteria, possibly reactive, follow-up imaging can be considered.    )  Tests in the medicine section of CPT®: ordered and reviewed  Independent visualization of images, tracings, or specimens: yes (Reviewed the patient's chest CT)    Risk of Complications, Morbidity, and/or Mortality  Presenting problems: high  Diagnostic procedures: high  Management options: moderate  General comments: Patient's labs are unremarkable. She still intermittently tachycardic but we have effectively ruled out ACS and PE. Remainder of her vital signs are unremarkable. Is possible that this is pleurisy versus mild pericarditis even the EKG shows no signs of that. Treating with anti-inflammatory medications and referring back to her primary care doctor    Patient Progress  Patient progress: improved    ED Course as of Nov 11 2246   Mon Nov 11, 2019   2244 CT chest shows only nodules known to the patient.     [JS]      ED Course User Index  [JS] Charmayne Duel, MD       Procedures

## 2020-07-23 PROBLEM — E78.00 HYPERCHOLESTEROLEMIA: Status: ACTIVE | Noted: 2020-07-23

## 2021-05-24 ENCOUNTER — HOSPITAL ENCOUNTER (OUTPATIENT)
Dept: MAMMOGRAPHY | Age: 41
Discharge: HOME | End: 2021-05-24
Attending: OBSTETRICS & GYNECOLOGY
Payer: COMMERCIAL

## 2021-05-24 DIAGNOSIS — Z12.31 ENCOUNTER FOR SCREENING MAMMOGRAM FOR BREAST CANCER: ICD-10-CM

## 2021-05-24 PROCEDURE — 77067 SCR MAMMO BI INCL CAD: CPT

## 2021-07-21 PROBLEM — Z87.891 PERSONAL HISTORY OF TOBACCO USE: Chronic | Status: ACTIVE | Noted: 2018-01-23

## 2021-07-21 PROBLEM — F33.0 MILD EPISODE OF RECURRENT MAJOR DEPRESSIVE DISORDER (HCC): Chronic | Status: ACTIVE | Noted: 2019-01-24

## 2021-07-21 PROBLEM — Z82.49 FAMILY HISTORY OF CEREBRAL ANEURYSM: Status: ACTIVE | Noted: 2021-07-21

## 2021-07-21 PROBLEM — E78.00 HYPERCHOLESTEROLEMIA: Chronic | Status: ACTIVE | Noted: 2020-07-23

## 2021-07-21 PROBLEM — R56.9 SEIZURES (HCC): Chronic | Status: ACTIVE | Noted: 2017-11-14

## 2021-08-17 ENCOUNTER — HOSPITAL ENCOUNTER (OUTPATIENT)
Dept: MRI IMAGING | Age: 41
Discharge: HOME | End: 2021-08-17
Attending: FAMILY MEDICINE
Payer: COMMERCIAL

## 2021-08-17 DIAGNOSIS — Z82.49 FAMILY HISTORY OF CEREBRAL ANEURYSM: ICD-10-CM

## 2021-08-17 DIAGNOSIS — G43.009 MIGRAINE WITHOUT AURA AND WITHOUT STATUS MIGRAINOSUS, NOT INTRACTABLE: ICD-10-CM

## 2021-08-17 PROCEDURE — 70544 MR ANGIOGRAPHY HEAD W/O DYE: CPT

## 2022-02-09 PROBLEM — Z01.419 WOMEN'S ANNUAL ROUTINE GYNECOLOGICAL EXAMINATION: Status: ACTIVE | Noted: 2022-02-09

## 2022-03-11 ENCOUNTER — HOSPITAL ENCOUNTER (OUTPATIENT)
Dept: GENERAL RADIOLOGY | Age: 42
Discharge: HOME | End: 2022-03-11
Payer: COMMERCIAL

## 2022-03-11 DIAGNOSIS — M25.511 ACUTE PAIN OF RIGHT SHOULDER: ICD-10-CM

## 2022-03-11 PROCEDURE — 73030 X-RAY EXAM OF SHOULDER: CPT

## 2022-03-18 PROBLEM — N30.10 INTERSTITIAL CYSTITIS: Status: ACTIVE | Noted: 2017-11-14

## 2022-03-18 PROBLEM — E78.00 HYPERCHOLESTEROLEMIA: Status: ACTIVE | Noted: 2020-07-23

## 2022-03-18 PROBLEM — R56.9 SEIZURES (HCC): Status: ACTIVE | Noted: 2017-11-14

## 2022-03-18 PROBLEM — E55.9 VITAMIN D DEFICIENCY: Status: ACTIVE | Noted: 2017-12-05

## 2022-03-18 PROBLEM — F51.01 PRIMARY INSOMNIA: Status: ACTIVE | Noted: 2017-11-14

## 2022-03-18 PROBLEM — D25.2 SUBSEROUS LEIOMYOMA OF UTERUS: Status: ACTIVE | Noted: 2018-01-10

## 2022-03-18 PROBLEM — Z87.891 PERSONAL HISTORY OF TOBACCO USE: Status: ACTIVE | Noted: 2018-01-23

## 2022-03-19 PROBLEM — F80.81 STUTTERING: Status: ACTIVE | Noted: 2017-12-19

## 2022-03-19 PROBLEM — M35.1 MIXED CONNECTIVE TISSUE DISEASE (HCC): Status: ACTIVE | Noted: 2017-12-05

## 2022-03-19 PROBLEM — N92.0 MENORRHAGIA WITH REGULAR CYCLE: Status: ACTIVE | Noted: 2018-01-23

## 2022-03-19 PROBLEM — L40.50 PSORIATIC ARTHRITIS (HCC): Status: ACTIVE | Noted: 2018-05-23

## 2022-03-19 PROBLEM — Z82.49 FAMILY HISTORY OF CEREBRAL ANEURYSM: Status: ACTIVE | Noted: 2021-07-21

## 2022-03-19 PROBLEM — Z01.419 WOMEN'S ANNUAL ROUTINE GYNECOLOGICAL EXAMINATION: Status: ACTIVE | Noted: 2022-02-09

## 2022-03-19 PROBLEM — G56.01 CARPAL TUNNEL SYNDROME ON RIGHT: Status: ACTIVE | Noted: 2019-03-19

## 2022-03-19 PROBLEM — K21.00 GASTROESOPHAGEAL REFLUX DISEASE WITH ESOPHAGITIS: Status: ACTIVE | Noted: 2017-11-14

## 2022-03-19 PROBLEM — J45.30 MILD PERSISTENT ASTHMA WITHOUT COMPLICATION: Status: ACTIVE | Noted: 2017-11-14

## 2022-03-20 PROBLEM — G43.909 MIGRAINES: Status: ACTIVE | Noted: 2018-10-17

## 2022-03-20 PROBLEM — F33.0 MILD EPISODE OF RECURRENT MAJOR DEPRESSIVE DISORDER (HCC): Status: ACTIVE | Noted: 2019-01-24

## 2022-03-20 PROBLEM — M79.7 FIBROMYALGIA: Status: ACTIVE | Noted: 2017-11-14

## 2022-05-26 ENCOUNTER — TELEPHONE (OUTPATIENT)
Dept: PRIMARY CARE CLINIC | Facility: CLINIC | Age: 42
End: 2022-05-26

## 2022-06-01 ENCOUNTER — HOSPITAL ENCOUNTER (OUTPATIENT)
Dept: MAMMOGRAPHY | Age: 42
Discharge: HOME | End: 2022-06-04

## 2022-06-01 DIAGNOSIS — Z12.31 SCREENING MAMMOGRAM, ENCOUNTER FOR: ICD-10-CM

## 2022-06-04 NOTE — TELEPHONE ENCOUNTER
May need to be seen at urgent care.
Pt called in statin that she always has diarrhea after everything she eats and it's been happening for the last 12 days or so.  Please call pt back
Spoke with patient. Patient wanting an appt for this, no available appts, please advise.
normal...

## 2022-06-27 ENCOUNTER — HOSPITAL ENCOUNTER (OUTPATIENT)
Dept: MAMMOGRAPHY | Age: 42
Discharge: HOME | End: 2022-06-30
Payer: COMMERCIAL

## 2022-06-27 DIAGNOSIS — Z12.31 VISIT FOR SCREENING MAMMOGRAM: ICD-10-CM

## 2022-06-27 PROCEDURE — 77067 SCR MAMMO BI INCL CAD: CPT

## 2022-07-18 ENCOUNTER — OFFICE VISIT (OUTPATIENT)
Dept: ORTHOPEDIC SURGERY | Age: 42
End: 2022-07-18
Payer: COMMERCIAL

## 2022-07-18 DIAGNOSIS — M75.101 TEAR OF RIGHT ROTATOR CUFF, UNSPECIFIED TEAR EXTENT, UNSPECIFIED WHETHER TRAUMATIC: Primary | ICD-10-CM

## 2022-07-18 PROCEDURE — 99214 OFFICE O/P EST MOD 30 MIN: CPT | Performed by: PHYSICIAN ASSISTANT

## 2022-07-19 NOTE — PROGRESS NOTES
Name: Moncho Cody  YOB: 1980  Gender: female  MRN: [id number]    CC:   Chief Complaint   Patient presents with    Shoulder Pain     Recheck right shoulder        HPI: Patient presents for follow-up on right shoulder pain. Patient had a subacromial injection on 3- and only had approximately 3 weeks of relief. Patient was also referred to rheumatology at that time and has had difficulty getting in. She has had 2 cancellations from the provider and now has an appointment in January. She has previously been on Enbrel for her connective tissue disorder but her previous rheumatologist left and she is unable to get in until January. 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 Allergies   Allergen Reactions    Latex Hives    Codeine Anaphylaxis    Hydromorphone Anaphylaxis    Metoclopramide Anaphylaxis    Penicillins Anaphylaxis     Pt states rash & redness. No swelling in throat.  ordered. Test dose before surgery    Sulfa Antibiotics Anaphylaxis     Past Medical History:   Diagnosis Date    Abnormal Papanicolaou smear of cervix     Chronic UTI     Family history of cerebral aneurysm 7/21/2021    Fibromyalgia     Gastroesophageal reflux disease with esophagitis 11/14/2017    Moderate persistent asthma without complication 57/20/3327    Other ill-defined conditions(799.89)     fibromyalgia/ Interstitial cystis    Primary insomnia 11/14/2017    Psoriatic arthritis ([address]     Psychiatric disorder     anxiety, obsessive comp.      Seizure disorder ([address] 11/14/2017    Seizures ([address]      Past Surgical History:   Procedure Laterality Date    APPENDECTOMY      BREAST REDUCTION SURGERY  2002    BREAST SURGERY      reduction    CARPAL TUNNEL RELEASE Right     CARPAL TUNNEL RELEASE Right 04/10/2019    CHOLECYSTECTOMY      GYN      D&E/ Tubal    HEENT      tonsils/ sinus    LAP,TUBAL CAUTERY      ORTHOPEDIC SURGERY right ankle, left foot    OTHER SURGICAL HISTORY      Lymph node removal, right side of neck    MT ABDOMEN SURGERY PROC UNLISTED      inguinal hernia age 14    TONSILLECTOMY       Family History   Problem Relation Age of Onset    Breast Cancer Maternal Great Grandmother     Breast Cancer Paternal Great Grandmother     Heart Disease Maternal Grandfather     Breast Cancer Maternal Grandmother         50's    Heart Disease Maternal Grandmother     Diabetes Maternal Grandmother     Cancer Sister         skin    Heart Attack Mother 40        had several TIAs    Cancer Mother     Diabetes Mother     Heart Disease Mother         Aortic Rupture    Stroke Mother     Colon Cancer Neg Hx     Uterine Cancer Neg Hx     Ovarian Cancer Neg Hx      Social History     Socioeconomic History    Marital status: Single     Spouse name: Not on file    Number of children: Not on file    Years of education: Not on file    Highest education level: Not on file   Occupational History    Not on file   Tobacco Use    Smoking status: Former     Packs/day: 0.25     Types: Cigarettes     Quit date: 5/1/2019     Years since quitting: 3.2    Smokeless tobacco: Never   Substance and Sexual Activity    Alcohol use: No    Drug use: No    Sexual activity: Not on file     Comment: BTL   Other Topics Concern    Not on file   Social History Narrative    Abuse: Feels safe at home, no history of physical abuse, no history of sexual abuse       Social Determinants of Health     Financial Resource Strain: Not on file   Food Insecurity: Not on file   Transportation Needs: Not on file   Physical Activity: Not on file   Stress: Not on file   Social Connections: Not on file   Intimate Partner Violence: Not on file   Housing Stability: Not on file        No flowsheet data found. Review of Systems  Non-contributory    PE right shoulder:    Full range of motion with pain overhead. 4/5 abduction, 4+/5 external rotation, 5/5 internal rotation.   Patient is tender to palpate the [facility] joint and biceps tendon. Positive belly press but negative liftoff test.  Patient has positive impingement sign. Distal motor function, sensation, pulses intact. A/Plan:     ICD-10-CM    1. Tear of right rotator cuff, unspecified tear extent, unspecified whether traumatic  M75.101 MRI SHOULDER RIGHT WO CONTRAST           The patient has had failure of conservative measures including home exercise program and injection. She has had increased weakness in comparison to her previous visit. She does have a mixed connective tissue disorder and has previously been on Enbrel which complicates her diagnosis. Patient also has psoriatic arthritis at this point, I would like to obtain an MRI in order to further evaluate for rotator cuff pathology. I will see the patient back after imaging discuss definitive plans and management based on findings. Given the chronicity and severity of the patient's symptoms, we will obtain an MRI. We will see them back after the scan and make a determination regarding further treatment. If there is a tear or other problem, they may require surgery. If negative, would recommend NSAID's, PT, or injection. They are amenable to this treatment plan. Return for MRI results with Dr. Hailee Ortiz.         [place], [place]  07/19/22

## 2022-08-15 DIAGNOSIS — R73.01 IMPAIRED FASTING GLUCOSE: ICD-10-CM

## 2022-08-15 DIAGNOSIS — G40.309 NONINTRACTABLE GENERALIZED IDIOPATHIC EPILEPSY WITHOUT STATUS EPILEPTICUS (HCC): ICD-10-CM

## 2022-08-15 DIAGNOSIS — E78.00 PURE HYPERCHOLESTEROLEMIA, UNSPECIFIED: ICD-10-CM

## 2022-08-15 DIAGNOSIS — E78.00 HYPERCHOLESTEROLEMIA: Primary | ICD-10-CM

## 2022-08-15 DIAGNOSIS — E55.9 VITAMIN D DEFICIENCY, UNSPECIFIED: ICD-10-CM

## 2022-08-15 RX ORDER — METFORMIN HYDROCHLORIDE 500 MG/1
TABLET, EXTENDED RELEASE ORAL
Qty: 30 TABLET | OUTPATIENT
Start: 2022-08-15

## 2022-08-15 RX ORDER — ROSUVASTATIN CALCIUM 5 MG/1
TABLET, COATED ORAL
Qty: 30 TABLET | OUTPATIENT
Start: 2022-08-15

## 2022-08-15 RX ORDER — VENLAFAXINE HYDROCHLORIDE 75 MG/1
CAPSULE, EXTENDED RELEASE ORAL
Qty: 30 CAPSULE | OUTPATIENT
Start: 2022-08-15

## 2022-08-15 RX ORDER — PANTOPRAZOLE SODIUM 40 MG/1
TABLET, DELAYED RELEASE ORAL
Qty: 30 TABLET | OUTPATIENT
Start: 2022-08-15

## 2022-08-17 ENCOUNTER — TELEPHONE (OUTPATIENT)
Dept: ORTHOPEDIC SURGERY | Age: 42
End: 2022-08-17

## 2022-08-17 DIAGNOSIS — R73.01 IMPAIRED FASTING GLUCOSE: ICD-10-CM

## 2022-08-17 DIAGNOSIS — E55.9 VITAMIN D DEFICIENCY, UNSPECIFIED: ICD-10-CM

## 2022-08-17 DIAGNOSIS — G40.309 NONINTRACTABLE GENERALIZED IDIOPATHIC EPILEPSY WITHOUT STATUS EPILEPTICUS (HCC): ICD-10-CM

## 2022-08-17 DIAGNOSIS — E78.00 PURE HYPERCHOLESTEROLEMIA, UNSPECIFIED: ICD-10-CM

## 2022-08-17 DIAGNOSIS — E78.00 HYPERCHOLESTEROLEMIA: ICD-10-CM

## 2022-08-17 LAB
BASOPHILS # BLD: 0.1 K/UL (ref 0–0.2)
BASOPHILS NFR BLD: 1 % (ref 0–2)
DIFFERENTIAL METHOD BLD: ABNORMAL
EOSINOPHIL # BLD: 0.2 K/UL (ref 0–0.8)
EOSINOPHIL NFR BLD: 2 % (ref 0.5–7.8)
ERYTHROCYTE [DISTWIDTH] IN BLOOD BY AUTOMATED COUNT: 14.6 % (ref 11.9–14.6)
HCT VFR BLD AUTO: 41.6 % (ref 35.8–46.3)
HGB BLD-MCNC: 13.1 G/DL (ref 11.7–15.4)
IMM GRANULOCYTES # BLD AUTO: 0 K/UL (ref 0–0.5)
IMM GRANULOCYTES NFR BLD AUTO: 0 % (ref 0–5)
LYMPHOCYTES # BLD: 2.9 K/UL (ref 0.5–4.6)
LYMPHOCYTES NFR BLD: 32 % (ref 13–44)
MCH RBC QN AUTO: 28 PG (ref 26.1–32.9)
MCHC RBC AUTO-ENTMCNC: 31.5 G/DL (ref 31.4–35)
MCV RBC AUTO: 88.9 FL (ref 79.6–97.8)
MONOCYTES # BLD: 0.5 K/UL (ref 0.1–1.3)
MONOCYTES NFR BLD: 5 % (ref 4–12)
NEUTS SEG # BLD: 5.6 K/UL (ref 1.7–8.2)
NEUTS SEG NFR BLD: 60 % (ref 43–78)
NRBC # BLD: 0 K/UL (ref 0–0.2)
PLATELET # BLD AUTO: 424 K/UL (ref 150–450)
PMV BLD AUTO: 9.3 FL (ref 9.4–12.3)
RBC # BLD AUTO: 4.68 M/UL (ref 4.05–5.2)
WBC # BLD AUTO: 9.2 K/UL (ref 4.3–11.1)

## 2022-08-18 LAB
ALBUMIN SERPL-MCNC: 4.3 G/DL (ref 3.5–5)
ALBUMIN/GLOB SERPL: 1.3 {RATIO} (ref 1.2–3.5)
ALP SERPL-CCNC: 35 U/L (ref 50–136)
ALT SERPL-CCNC: 15 U/L (ref 12–65)
ANION GAP SERPL CALC-SCNC: 6 MMOL/L (ref 7–16)
AST SERPL-CCNC: 13 U/L (ref 15–37)
BILIRUB SERPL-MCNC: 0.3 MG/DL (ref 0.2–1.1)
BUN SERPL-MCNC: 9 MG/DL (ref 6–23)
CALCIUM SERPL-MCNC: 9.4 MG/DL (ref 8.3–10.4)
CHLORIDE SERPL-SCNC: 107 MMOL/L (ref 98–107)
CHOLEST SERPL-MCNC: 144 MG/DL
CO2 SERPL-SCNC: 26 MMOL/L (ref 21–32)
CREAT SERPL-MCNC: 0.8 MG/DL (ref 0.6–1)
EST. AVERAGE GLUCOSE BLD GHB EST-MCNC: 126 MG/DL
GLOBULIN SER CALC-MCNC: 3.2 G/DL (ref 2.3–3.5)
GLUCOSE SERPL-MCNC: 93 MG/DL (ref 65–100)
HBA1C MFR BLD: 6 % (ref 4.8–5.6)
HDLC SERPL-MCNC: 54 MG/DL (ref 40–60)
HDLC SERPL: 2.7 {RATIO}
LDLC SERPL CALC-MCNC: 77.4 MG/DL
POTASSIUM SERPL-SCNC: 4.3 MMOL/L (ref 3.5–5.1)
PROT SERPL-MCNC: 7.5 G/DL (ref 6.3–8.2)
SODIUM SERPL-SCNC: 139 MMOL/L (ref 136–145)
TRIGL SERPL-MCNC: 63 MG/DL (ref 35–150)
TSH, 3RD GENERATION: 1.59 UIU/ML (ref 0.36–3.74)
VLDLC SERPL CALC-MCNC: 12.6 MG/DL (ref 6–23)

## 2022-08-18 RX ORDER — ERGOCALCIFEROL 1.25 MG/1
CAPSULE ORAL
Qty: 4 CAPSULE | Refills: 3 | Status: SHIPPED | OUTPATIENT
Start: 2022-08-18 | End: 2022-10-18

## 2022-09-07 ENCOUNTER — OFFICE VISIT (OUTPATIENT)
Dept: ORTHOPEDIC SURGERY | Age: 42
End: 2022-09-07
Payer: COMMERCIAL

## 2022-09-07 DIAGNOSIS — M75.41 IMPINGEMENT SYNDROME OF RIGHT SHOULDER: ICD-10-CM

## 2022-09-07 DIAGNOSIS — M25.511 RIGHT SHOULDER PAIN, UNSPECIFIED CHRONICITY: ICD-10-CM

## 2022-09-07 DIAGNOSIS — M75.101 TEAR OF RIGHT ROTATOR CUFF, UNSPECIFIED TEAR EXTENT, UNSPECIFIED WHETHER TRAUMATIC: Primary | ICD-10-CM

## 2022-09-07 PROCEDURE — 20610 DRAIN/INJ JOINT/BURSA W/O US: CPT | Performed by: ORTHOPAEDIC SURGERY

## 2022-09-07 RX ORDER — MELOXICAM 15 MG/1
15 TABLET ORAL DAILY
Qty: 30 TABLET | Refills: 1 | Status: SHIPPED | OUTPATIENT
Start: 2022-09-07 | End: 2022-10-07

## 2022-09-07 RX ORDER — TRIAMCINOLONE ACETONIDE 40 MG/ML
80 INJECTION, SUSPENSION INTRA-ARTICULAR; INTRAMUSCULAR ONCE
Status: COMPLETED | OUTPATIENT
Start: 2022-09-07 | End: 2022-09-07

## 2022-09-07 RX ORDER — GABAPENTIN 300 MG/1
CAPSULE ORAL
COMMUNITY
Start: 2022-08-17 | End: 2022-10-04

## 2022-09-07 RX ORDER — AMITRIPTYLINE HYDROCHLORIDE 25 MG/1
25 TABLET, FILM COATED ORAL
COMMUNITY
End: 2022-10-04

## 2022-09-07 RX ORDER — LEVETIRACETAM 250 MG/1
TABLET ORAL
COMMUNITY
Start: 2022-03-16

## 2022-09-07 RX ORDER — BUDESONIDE AND FORMOTEROL FUMARATE DIHYDRATE 80; 4.5 UG/1; UG/1
2 AEROSOL RESPIRATORY (INHALATION) 2 TIMES DAILY
COMMUNITY
End: 2022-10-04

## 2022-09-07 RX ORDER — LEVETIRACETAM 250 MG/1
TABLET ORAL
COMMUNITY
Start: 2022-08-16

## 2022-09-07 RX ORDER — TIZANIDINE 4 MG/1
4 TABLET ORAL
COMMUNITY
End: 2022-10-04

## 2022-09-07 RX ORDER — B-COMPLEX WITH VITAMIN C
1 TABLET ORAL DAILY
COMMUNITY
End: 2022-10-04

## 2022-09-07 RX ORDER — SIMVASTATIN 10 MG
10 TABLET ORAL
COMMUNITY
End: 2022-09-22 | Stop reason: SDUPTHER

## 2022-09-07 RX ORDER — NALOXONE HYDROCHLORIDE 4 MG/.1ML
4 SPRAY NASAL
COMMUNITY
Start: 2022-05-11 | End: 2022-10-04

## 2022-09-07 RX ORDER — GABAPENTIN 300 MG/1
CAPSULE ORAL
COMMUNITY
Start: 2022-02-21 | End: 2022-10-04

## 2022-09-07 RX ORDER — MONTELUKAST SODIUM 10 MG/1
10 TABLET ORAL
COMMUNITY
End: 2022-10-04

## 2022-09-07 RX ADMIN — TRIAMCINOLONE ACETONIDE 80 MG: 40 INJECTION, SUSPENSION INTRA-ARTICULAR; INTRAMUSCULAR at 17:06

## 2022-09-07 NOTE — PROGRESS NOTES
Name: Kayla Records  YOB: 1980  Gender: female  MRN: [id number]    CC:   Chief Complaint   Patient presents with    Follow-up     MRI results right shoulder    Right shoulder pain    HPI:    Patient presents for follow-up on right shoulder pain. Patient had a subacromial injection on 3- and only had approximately 3 weeks of relief. Patient was also referred to rheumatology at that time and has had difficulty getting in. She has had 2 cancellations from the provider and now has an appointment in January. She has previously been on Enbrel for her connective tissue disorder but her previous rheumatologist left and she is unable to get in until January. 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 Allergies   Allergen Reactions    Latex Hives     Other reaction(s): Hives/Swelling-Allergy    Codeine Anaphylaxis     Other reaction(s): Anaphylactic shock-Allergy    Hydromorphone Anaphylaxis     Other reaction(s): Anaphylactic shock-Allergy    Metoclopramide Anaphylaxis     Other reaction(s): Anaphylactic shock-Allergy    Penicillins Anaphylaxis     Pt states rash & redness. No swelling in throat. Dr ordered. Test dose before surgery  Other reaction(s): Anaphylactic shock-Allergy    Sulfa Antibiotics Anaphylaxis     Other reaction(s): Anaphylactic shock-Allergy    Adhesive Tape Hives     Past Medical History:   Diagnosis Date    Abnormal Papanicolaou smear of cervix     Chronic UTI     Family history of cerebral aneurysm 7/21/2021    Fibromyalgia     Gastroesophageal reflux disease with esophagitis 11/14/2017    Moderate persistent asthma without complication 82/57/1730    Other ill-defined conditions(799.89)     fibromyalgia/ Interstitial cystis    Primary insomnia 11/14/2017    Psoriatic arthritis ([facility]     Psychiatric disorder     anxiety, obsessive comp.      Seizure disorder ([facility] 11/14/2017    Seizures ([facility] Past Surgical History:   Procedure Laterality Date    APPENDECTOMY      BREAST REDUCTION SURGERY  2002    BREAST SURGERY      reduction    CARPAL TUNNEL RELEASE Right     CARPAL TUNNEL RELEASE Right 04/10/2019    CHOLECYSTECTOMY      GYN      D&E/ Tubal    HEENT      tonsils/ sinus    LAP,TUBAL CAUTERY      ORTHOPEDIC SURGERY      right ankle, left foot    OTHER SURGICAL HISTORY      Lymph node removal, right side of neck    ME ABDOMEN SURGERY PROC UNLISTED      inguinal hernia age 14    TONSILLECTOMY       Family History   Problem Relation Age of Onset    Breast Cancer Maternal Great Grandmother     Breast Cancer Paternal Great Grandmother     Heart Disease Maternal Grandfather     Breast Cancer Maternal Grandmother         52's    Heart Disease Maternal Grandmother     Diabetes Maternal Grandmother     Cancer Sister         skin    Heart Attack Mother 40        had several TIAs    Cancer Mother     Diabetes Mother     Heart Disease Mother         Aortic Rupture    Stroke Mother     Colon Cancer Neg Hx     Uterine Cancer Neg Hx     Ovarian Cancer Neg Hx      Social History     Socioeconomic History    Marital status: Single     Spouse name: Not on file    Number of children: Not on file    Years of education: Not on file    Highest education level: Not on file   Occupational History    Not on file   Tobacco Use    Smoking status: Former     Packs/day: 0.25     Types: Cigarettes     Quit date: 5/1/2019     Years since quitting: 3.3    Smokeless tobacco: Never   Substance and Sexual Activity    Alcohol use: No    Drug use: No    Sexual activity: Not on file     Comment: BTL   Other Topics Concern    Not on file   Social History Narrative    Abuse: Feels safe at home, no history of physical abuse, no history of sexual abuse       Social Determinants of Health     Financial Resource Strain: Not on file   Food Insecurity: Not on file   Transportation Needs: Not on file   Physical Activity: Not on file   Stress: Not on file   Social Connections: Not on file   Intimate Partner Violence: Not on file   Housing Stability: Not on file        No flowsheet data found. Review of Systems  History of psoriatic arthritis, history of seizures, history of reflux, history of mixed connective tissue disorder, fibromyalgia  Pertinent positives and negatives are addressed with the patient, particularly those related to musculoskeletal concerns. Non-orthopaedic concerns were referred back to the primary care physician. PE:    GEN: General appearance is that of a healthy patient, alert and oriented, in no distress. WDWN. HEENT:  Normocephalic, atraumatic. Extraocular muscles are intact. Neck:  Supple. Heart:  Regular pulse exam on all extremities. Good color and warmth noted. Lungs:  Normal non-labored breathing with no obvious shortness of breath. Abdomen:  WNL's. Skin:  No signs of rash or bruising. Pysch: Answers questions appropriately, AO X 3. MSK:    Cervical spine: No tenderness. Normal active motion. Negative Spurling's maneuver. SHOULDER   Right (Involved) left   Skin Intact Intact   Radial Pulses 2+ Symmetrical 2+ Symmetrical   Deformity None Normal   Myotomes Normal Normal   Dermatomes  Normal Normal    ROM Range of motion is fairly symmetric Full   Strength Mild weakness in the scapular plane No weakness   Atrophy None noted None noted   Effusion/Swelling  None noted None noted   Palpation Minimally TTP at the shoulder along the biceps No tenderness   Bicep Tendon Rupture  Negative Negative signs   Bear Hug, Belly Press Negative, negative Negative   Crossed Arm Adduction Test normal    Instability/Ant. Apprehension Test None noted None noted   Impingement Positive  simona Rey AOM Negative          Narrative   EXAM: MRI SHOULDER RIGHT WO CONTRAST   DATE: 8/25/2022 10:17 AM   ACCESSION: [id number]       CLINICAL INDICATION: 36years old Female with right shoulder pain for 1 year.     Concern for rotator cuff tear.         COMPARISON: Right shoulder radiographs 3/11/2022. TECHNIQUE:  MRI of the right shoulder was performed without contrast using a   local coil. Multisequence, multiplanar images were obtained. FINDINGS:        Acromioclavicular Joint:   Mild narrowing with osseous overgrowth and mild subchondral edema   acromioclavicular joint. There is trace fluid in the subacromial-subdeltoid   bursa. Rotator cuff:   Supraspinatus and infraspinatus tendinosis. Moderate grade partial thickness   articular surface tear of the distal subscapularis. The teres minor tendon is   intact. Rotator cuff muscle bulk is normal.  Possible faint intramuscular edema   of the infraspinatus. Biceps:   Tendinosis of the intra-articular long head biceps tendon. Glenohumeral joint:   On this nonarthrographic examination, tear throughout the superior labrum and   extending into the posterior labrum. There is a paralabral cyst which extends   from the superior labrum to the spinoglenoid notch measuring approximately 7 x   17 x 9 mm. Additional smaller cystic foci adjacent to the anterior superior   labrum which may communicate with this larger cyst.       No high-grade glenohumeral chondral defect. No glenohumeral joint effusion. Bone:   No evidence of fracture or marrow replacing process. Impression       1. Moderate grade partial thickness articular surface tear of the distal   subscapularis. Supraspinatus and infraspinatus tendinosis. No rotator cuff   muscle atrophy. 2.  Tear throughout the superior labrum and involving the posterior labrum with   associated paralabral cyst in the spinoglenoid notch. 3.  Faint edema within the infraspinatus muscle, which may reflect early   denervation changes related the spinoglenoid notch cyst versus low-grade muscle   strain. 4.  Intra-articular long head biceps tendinosis.        5.  Mild acromioclavicular degenerative injury to blood vessels or nerves, the patient verbally consented to proceed with a glenohumeral joint injection. The affected right shoulder was sterilely prepped in standard fashion and injected with 2 cc of Kenalog (40mg/ml), 2 cc of 1% Lidocaine, and 2 cc of 0.5% Marcaine into the anterior shoulder near the biceps tendon. The patient tolerated the injection well. Return in about 6 weeks (around 10/19/2022). Thank you for the opportunity to see your patient. If you have any questions please give me a call.   Sincerely,    Avelino Dan MD  09/07/22

## 2022-09-22 ENCOUNTER — TELEPHONE (OUTPATIENT)
Dept: PRIMARY CARE CLINIC | Facility: CLINIC | Age: 42
End: 2022-09-22

## 2022-09-22 RX ORDER — PANTOPRAZOLE SODIUM 40 MG/1
40 TABLET, DELAYED RELEASE ORAL DAILY
Qty: 30 TABLET | Refills: 0 | Status: SHIPPED | OUTPATIENT
Start: 2022-09-22 | End: 2022-10-04 | Stop reason: SDUPTHER

## 2022-09-22 RX ORDER — METFORMIN HYDROCHLORIDE 500 MG/1
500 TABLET, EXTENDED RELEASE ORAL
Qty: 30 TABLET | Refills: 0 | Status: SHIPPED | OUTPATIENT
Start: 2022-09-22 | End: 2022-10-04 | Stop reason: SDUPTHER

## 2022-09-22 RX ORDER — SIMVASTATIN 10 MG
10 TABLET ORAL NIGHTLY
Qty: 30 TABLET | Refills: 0 | Status: SHIPPED | OUTPATIENT
Start: 2022-09-22 | End: 2022-10-04

## 2022-09-22 RX ORDER — VENLAFAXINE HYDROCHLORIDE 75 MG/1
75 CAPSULE, EXTENDED RELEASE ORAL DAILY
Qty: 30 CAPSULE | Refills: 0 | Status: SHIPPED | OUTPATIENT
Start: 2022-09-22 | End: 2022-10-04 | Stop reason: SDUPTHER

## 2022-09-22 NOTE — TELEPHONE ENCOUNTER
Pt requesting a refill of    Simvastatin  Metformin  Effexor  Protonix    Send to [facility]    Pt has appt on 10/4

## 2022-09-28 RX ORDER — ERGOCALCIFEROL 1.25 MG/1
CAPSULE ORAL
Qty: 4 CAPSULE | Refills: 3 | OUTPATIENT
Start: 2022-09-28

## 2022-10-04 ENCOUNTER — TELEMEDICINE (OUTPATIENT)
Dept: PRIMARY CARE CLINIC | Facility: CLINIC | Age: 42
End: 2022-10-04
Payer: COMMERCIAL

## 2022-10-04 DIAGNOSIS — M75.101 TEAR OF RIGHT ROTATOR CUFF, UNSPECIFIED TEAR EXTENT, UNSPECIFIED WHETHER TRAUMATIC: ICD-10-CM

## 2022-10-04 DIAGNOSIS — M75.41 IMPINGEMENT SYNDROME OF RIGHT SHOULDER: ICD-10-CM

## 2022-10-04 DIAGNOSIS — E78.00 HYPERCHOLESTEROLEMIA: ICD-10-CM

## 2022-10-04 DIAGNOSIS — M25.511 RIGHT SHOULDER PAIN, UNSPECIFIED CHRONICITY: ICD-10-CM

## 2022-10-04 DIAGNOSIS — M35.1 MIXED CONNECTIVE TISSUE DISEASE (HCC): ICD-10-CM

## 2022-10-04 DIAGNOSIS — F33.0 MILD EPISODE OF RECURRENT MAJOR DEPRESSIVE DISORDER (HCC): Primary | ICD-10-CM

## 2022-10-04 DIAGNOSIS — L40.50 PSORIATIC ARTHRITIS (HCC): ICD-10-CM

## 2022-10-04 DIAGNOSIS — E55.9 VITAMIN D DEFICIENCY: ICD-10-CM

## 2022-10-04 DIAGNOSIS — N30.10 INTERSTITIAL CYSTITIS: ICD-10-CM

## 2022-10-04 DIAGNOSIS — R73.01 IMPAIRED FASTING GLUCOSE: ICD-10-CM

## 2022-10-04 DIAGNOSIS — R56.9 SEIZURES (HCC): ICD-10-CM

## 2022-10-04 PROCEDURE — 99214 OFFICE O/P EST MOD 30 MIN: CPT | Performed by: FAMILY MEDICINE

## 2022-10-04 RX ORDER — METFORMIN HYDROCHLORIDE 500 MG/1
500 TABLET, EXTENDED RELEASE ORAL
Qty: 30 TABLET | Refills: 5 | Status: SHIPPED | OUTPATIENT
Start: 2022-10-04

## 2022-10-04 RX ORDER — ROSUVASTATIN CALCIUM 5 MG/1
5 TABLET, COATED ORAL DAILY
Qty: 30 TABLET | Refills: 5 | Status: SHIPPED | OUTPATIENT
Start: 2022-10-04

## 2022-10-04 RX ORDER — VENLAFAXINE HYDROCHLORIDE 75 MG/1
75 CAPSULE, EXTENDED RELEASE ORAL DAILY
Qty: 30 CAPSULE | Refills: 5 | Status: SHIPPED | OUTPATIENT
Start: 2022-10-04

## 2022-10-04 RX ORDER — RIMEGEPANT SULFATE 75 MG/75MG
1 TABLET, ORALLY DISINTEGRATING ORAL DAILY PRN
Qty: 30 TABLET | Refills: 2 | Status: SHIPPED | OUTPATIENT
Start: 2022-10-04

## 2022-10-04 RX ORDER — PANTOPRAZOLE SODIUM 40 MG/1
40 TABLET, DELAYED RELEASE ORAL DAILY
Qty: 30 TABLET | Refills: 5 | Status: SHIPPED | OUTPATIENT
Start: 2022-10-04

## 2022-10-04 NOTE — PROGRESS NOTES
Radha Del Valle (:  1980) is a Established patient, here for evaluation of the following:    Assessment & Plan   Below is the assessment and plan developed based on review of pertinent history, physical exam, labs, studies, and medications. 1. Mild episode of recurrent major depressive disorder (HCC)  -     venlafaxine (EFFEXOR XR) 75 MG extended release capsule; Take 1 capsule by mouth daily, Disp-30 capsule, R-5Normal  2. Tear of right rotator cuff, unspecified tear extent, unspecified whether traumatic  3. Right shoulder pain, unspecified chronicity  4. Impingement syndrome of right shoulder  5. Mixed connective tissue disease ([facility]  6. Hypercholesterolemia  -     rosuvastatin (CRESTOR) 5 MG tablet; Take 1 tablet by mouth daily TAKE 1 TABLET BY MOUTH EVERY NIGHT, Disp-30 tablet, R-5Normal  7. Vitamin D deficiency  8. Seizures ([facility]  9. Interstitial cystitis  10. P[facility] ([facility]  11. Impaired fasting glucose  -     metFORMIN (GLUCOPHAGE-XR) 500 MG extended release tablet; Take 1 tablet by mouth daily (with breakfast) TAKE 1 TABLET BY MOUTH DAILY WITH DINNER, Disp-30 tablet, R-5Normal    We reviewed her labs and reviewed her medications. She we will continue her current meds. She is currently on gabapentin 600 mg 3 times daily prescribed by neurology. Also takes Keppra 250 mg in the evening and 750 mg in the morning. Requesting referral to urology for management of her interstitial cystitis. She has history of migraine headaches. She was given samples of Nurtec medication which worked very well. In the past she had used Fioricet. She is requesting refill of this medication. She will otherwise follow-up here as needed. Return in about 6 months (around 2023), or if symptoms worsen or fail to improve, for labs 1 week prior to visit. Subjective   HPI  Here to review labs. Has torn rotator cuff of right shoulder.   Currently seeing orthopedist.  Has been referred for physical therapy. She has a history of depression, diabetes, rheumatoid arthritis, hypercholesterolemia, and vitamin D deficiency. She denies any chest pain or shortness of breath. Here to review labs. She has history of interstitial cystitis and has been on Elmiron. She has been taking this daily now for several years. Insurance will no longer cover and is indicating that it is for short-term use. She is no longer seeing urology. She otherwise has no other complaints. Review of Systems       Objective   Patient-Reported Vitals  No data recorded     Physical Exam           [place], was evaluated through a synchronous (real-time) audio-video encounter. The patient (or guardian if applicable) is aware that this is a billable service, which includes applicable co-pays. This Virtual Visit was conducted with patient's (and/or legal guardian's) consent. The visit was conducted pursuant to the emergency declaration under the [address] [place] [address], [address] [place] [address] authority and the [employer] [place] and [employer] General Act. Patient identification was verified, and a caregiver was present when appropriate. The patient was located at Home: [place] [id number]. Provider was located at [facility] ([address] [place], [place] [address]t): Radha [address] [place] [address] 14..         --Naty Webber MD

## 2022-10-18 RX ORDER — ERGOCALCIFEROL 1.25 MG/1
CAPSULE ORAL
Qty: 4 CAPSULE | Refills: 3 | Status: SHIPPED | OUTPATIENT
Start: 2022-10-18 | End: 2022-10-19 | Stop reason: SDUPTHER

## 2022-10-19 ENCOUNTER — TELEPHONE (OUTPATIENT)
Dept: PRIMARY CARE CLINIC | Facility: CLINIC | Age: 42
End: 2022-10-19

## 2022-10-19 ENCOUNTER — OFFICE VISIT (OUTPATIENT)
Dept: ORTHOPEDIC SURGERY | Age: 42
End: 2022-10-19
Payer: COMMERCIAL

## 2022-10-19 DIAGNOSIS — M25.511 RIGHT SHOULDER PAIN, UNSPECIFIED CHRONICITY: ICD-10-CM

## 2022-10-19 DIAGNOSIS — M75.41 IMPINGEMENT SYNDROME OF RIGHT SHOULDER: ICD-10-CM

## 2022-10-19 DIAGNOSIS — M75.101 TEAR OF RIGHT ROTATOR CUFF, UNSPECIFIED TEAR EXTENT, UNSPECIFIED WHETHER TRAUMATIC: Primary | ICD-10-CM

## 2022-10-19 PROCEDURE — 99214 OFFICE O/P EST MOD 30 MIN: CPT | Performed by: ORTHOPAEDIC SURGERY

## 2022-10-19 RX ORDER — ERGOCALCIFEROL 1.25 MG/1
CAPSULE ORAL
Qty: 4 CAPSULE | Refills: 3 | Status: SHIPPED | OUTPATIENT
Start: 2022-10-19

## 2022-10-19 NOTE — PROGRESS NOTES
Name: Roxanna Allen  YOB: 1980  Gender: female  MRN: [id number]    CC:   Chief Complaint   Patient presents with    Shoulder Pain     Recheck right shoulder        HPI: Patient presents for follow-up of right shoulder pain. Patient had biceps tendon injection on 9-7-2022 and notes 4.5 weeks relief with injection. Patient also saw the neurologist and is seen for her cervical spine. She states that they did not send her to any type of pain management for possible injections. Patient notes continued right lateral spine pain as well as anterior shoulder pain. She notes that she is actually moving to [place] in 2 weeks. Also meloxicam 15 mg seems to be helping as well. Recall that the patient has a connective tissue disorder and has previously been on Enbrel. She has a  rheumatology appointment in January. 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 Allergies   Allergen Reactions    Latex Hives     Other reaction(s): Hives/Swelling-Allergy    Codeine Anaphylaxis     Other reaction(s): Anaphylactic shock-Allergy    Hydromorphone Anaphylaxis     Other reaction(s): Anaphylactic shock-Allergy    Metoclopramide Anaphylaxis     Other reaction(s): Anaphylactic shock-Allergy    Penicillins Anaphylaxis     Pt states rash & redness. No swelling in throat. Dr ordered. Test dose before surgery  Other reaction(s): Anaphylactic shock-Allergy    Sulfa Antibiotics Anaphylaxis     Other reaction(s):  Anaphylactic shock-Allergy    Adhesive Tape Hives     Past Medical History:   Diagnosis Date    Abnormal Papanicolaou smear of cervix     Chronic UTI     Family history of cerebral aneurysm 7/21/2021    Fibromyalgia     Gastroesophageal reflux disease with esophagitis 11/14/2017    Moderate persistent asthma without complication 72/89/2646    Other ill-defined conditions(799.89)     fibromyalgia/ Interstitial cystis    Primary insomnia 11/14/2017    Psoriatic arthritis ([facility]     Psychiatric disorder anxiety, obsessive comp.      Seizure disorder ([facility] 11/14/2017    Seizures ([facility]      Past Surgical History:   Procedure Laterality Date    APPENDECTOMY      BREAST REDUCTION SURGERY  2002    BREAST SURGERY      reduction    CARPAL TUNNEL RELEASE Right     CARPAL TUNNEL RELEASE Right 04/10/2019    CHOLECYSTECTOMY      GYN      D&E/ Tubal    HEENT      tonsils/ sinus    LAP,TUBAL CAUTERY      ORTHOPEDIC SURGERY      right ankle, left foot    OTHER SURGICAL HISTORY      Lymph node removal, right side of neck    AL ABDOMEN SURGERY PROC UNLISTED      inguinal hernia age 14    TONSILLECTOMY       Family History   Problem Relation Age of Onset    Breast Cancer Maternal Great Grandmother     Breast Cancer Paternal Great Grandmother     Heart Disease Maternal Grandfather     Breast Cancer Maternal Grandmother         52's    Heart Disease Maternal Grandmother     Diabetes Maternal Grandmother     Cancer Sister         skin    Heart Attack Mother 40        had several TIAs    Cancer Mother     Diabetes Mother     Heart Disease Mother         Aortic Rupture    Stroke Mother     Colon Cancer Neg Hx     Uterine Cancer Neg Hx     Ovarian Cancer Neg Hx      Social History     Socioeconomic History    Marital status: Single     Spouse name: Not on file    Number of children: Not on file    Years of education: Not on file    Highest education level: Not on file   Occupational History    Not on file   Tobacco Use    Smoking status: Former     Packs/day: 0.25     Types: Cigarettes     Quit date: 5/1/2019     Years since quitting: 3.4    Smokeless tobacco: Never   Substance and Sexual Activity    Alcohol use: No    Drug use: No    Sexual activity: Not on file     Comment: BTL   Other Topics Concern    Not on file   Social History Narrative    Abuse: Feels safe at home, no history of physical abuse, no history of sexual abuse       Social Determinants of Health     Financial Resource Strain: Not on file   Food Insecurity: Not on file Transportation Needs: Not on file   Physical Activity: Not on file   Stress: Not on file   Social Connections: Not on file   Intimate Partner Violence: Not on file   Housing Stability: Not on file        No flowsheet data found. Review of Systems  Non-contributory    PE right shoulder:       SHOULDER   Right (Involved) left   Skin Intact Intact   Radial Pulses 2+ Symmetrical 2+ Symmetrical   Deformity None Normal   Myotomes Normal Normal   Dermatomes  Normal Normal    ROM Range of motion is fairly symmetric Full   Strength Mild weakness in the scapular plane No weakness   Atrophy None noted None noted   Effusion/Swelling  None noted None noted   Palpation TTP at the shoulder along the biceps No tenderness   Bicep Tendon Rupture  Negative Negative signs   Bear Hug, Belly Press Negative, negative Negative   Crossed Arm Adduction Test normal     Instability/Ant. Apprehension Test None noted None noted   Impingement Positive  simona Rey AOM Negative            Narrative   EXAM: MRI SHOULDER RIGHT WO CONTRAST   DATE: 8/25/2022 10:17 AM   ACCESSION: [id number]       CLINICAL INDICATION: 36years old Female with right shoulder pain for 1 year. Concern for rotator cuff tear. COMPARISON: Right shoulder radiographs 3/11/2022. TECHNIQUE:  MRI of the right shoulder was performed without contrast using a   local coil. Multisequence, multiplanar images were obtained. FINDINGS:        Acromioclavicular Joint:   Mild narrowing with osseous overgrowth and mild subchondral edema   acromioclavicular joint. There is trace fluid in the subacromial-subdeltoid   bursa. Rotator cuff:   Supraspinatus and infraspinatus tendinosis. Moderate grade partial thickness   articular surface tear of the distal subscapularis. The teres minor tendon is   intact. Rotator cuff muscle bulk is normal.  Possible faint intramuscular edema   of the infraspinatus.        Biceps:   Tendinosis of the intra-articular long head biceps tendon. Glenohumeral joint:   On this nonarthrographic examination, tear throughout the superior labrum and   extending into the posterior labrum. There is a paralabral cyst which extends   from the superior labrum to the spinoglenoid notch measuring approximately 7 x   17 x 9 mm. Additional smaller cystic foci adjacent to the anterior superior   labrum which may communicate with this larger cyst.       No high-grade glenohumeral chondral defect. No glenohumeral joint effusion. Bone:   No evidence of fracture or marrow replacing process. Impression       1. Moderate grade partial thickness articular surface tear of the distal   subscapularis. Supraspinatus and infraspinatus tendinosis. No rotator cuff   muscle atrophy. 2.  Tear throughout the superior labrum and involving the posterior labrum with   associated paralabral cyst in the spinoglenoid notch. 3.  Faint edema within the infraspinatus muscle, which may reflect early   denervation changes related the spinoglenoid notch cyst versus low-grade muscle   strain. 4.  Intra-articular long head biceps tendinosis. 5.  Mild acromioclavicular degenerative change. 6.  Mild subacromial/subdeltoid bursitis. I have reviewed the MRI as well. There are some degenerative changes seen anteriorly as well as posterior superiorly with some cystic change of the spinoglenoid notch. No muscle atrophy is noted. No rotator cuff tearing. Some mild edema seen at the distal clavicle some cystic change of the anterior superior glenoid as well. A/Plan:     ICD-10-CM    1. Tear of right rotator cuff, unspecified tear extent, unspecified whether traumatic  M75.101       2. Right shoulder pain, unspecified chronicity  M25.511       3. Impingement syndrome of right shoulder  M75.41            Refer to rheumatology  Refer to pain management for possible treatment of her neck.   She should obtain a copy of her MRI of her neck to bring to them. Discussed possible follow-up even done in [place] with orthopedist or at [address]. Discussed options such as [facility], [place], etc.  Return for As discussed.         Margarita Mendoza MD  10/19/22

## 2022-11-09 ENCOUNTER — OFFICE VISIT (OUTPATIENT)
Dept: UROLOGY | Age: 42
End: 2022-11-09
Payer: COMMERCIAL

## 2022-11-09 DIAGNOSIS — N20.0 RENAL STONE: ICD-10-CM

## 2022-11-09 DIAGNOSIS — N30.10 INTERSTITIAL CYSTITIS: Primary | ICD-10-CM

## 2022-11-09 DIAGNOSIS — R31.29 MICROHEMATURIA: ICD-10-CM

## 2022-11-09 LAB
BILIRUBIN, URINE, POC: NEGATIVE
BLOOD URINE, POC: NORMAL
GLUCOSE URINE, POC: NEGATIVE
KETONES, URINE, POC: NEGATIVE
LEUKOCYTE ESTERASE, URINE, POC: NORMAL
NITRITE, URINE, POC: NEGATIVE
PH, URINE, POC: 6.5 (ref 4.6–8)
PROTEIN,URINE, POC: NEGATIVE
SPECIFIC GRAVITY, URINE, POC: 1.02 (ref 1–1.03)
URINALYSIS CLARITY, POC: NORMAL
URINALYSIS COLOR, POC: NORMAL
UROBILINOGEN, POC: NORMAL

## 2022-11-09 PROCEDURE — 81003 URINALYSIS AUTO W/O SCOPE: CPT | Performed by: NURSE PRACTITIONER

## 2022-11-09 PROCEDURE — 99204 OFFICE O/P NEW MOD 45 MIN: CPT | Performed by: NURSE PRACTITIONER

## 2022-11-09 RX ORDER — PENTOSAN POLYSULFATE SODIUM 100 MG/1
100 CAPSULE, GELATIN COATED ORAL 2 TIMES DAILY
Qty: 180 CAPSULE | Refills: 1 | Status: SHIPPED | OUTPATIENT
Start: 2022-11-09

## 2022-11-09 ASSESSMENT — ENCOUNTER SYMPTOMS
HEARTBURN: 1
INDIGESTION: 1
BACK PAIN: 1
SHORTNESS OF BREATH: 1

## 2022-11-09 NOTE — PROGRESS NOTES
[facility]  [address], [address]  : 1980    Chief Complaint   Patient presents with    New Patient     Interstitial Cystitis          HPI     [place] is a 36 y.o. female referred to establish care for interstitial cystitis. Reports being diagnosed by [address] 10 years ago. She reports having positive in office potassium test. Has been on elmiron 100 mg 2-3 x/day since. She recently had insurance change and her Laureen Bers was denies unless approved by a specialist. She is now using elmiron 100 mg daily. Having bladder pain w the lower dose. Prior to Laureen Youngblood, she did try pelvic floor exercises on her on and atarax wo success. She was on elavil for another issue but could not tolerate the medication. She does follow IC diet. Main triggers of acidic food/drink, caffeine, and spice. She also reports h/o microhemturia and renal stones. She had kidney stones with both pregnancies. Passed a few stones since. No current issues. No recent upper tract imaging.     . Intact uterus. Reports painful sex. No personal or family h/o urological CA. Former smoker. Past Medical History:   Diagnosis Date    Abnormal Papanicolaou smear of cervix     Chronic UTI     Family history of cerebral aneurysm 2021    Fibromyalgia     Gastroesophageal reflux disease with esophagitis 2017    Moderate persistent asthma without complication     Other ill-defined conditions(799.89)     fibromyalgia/ Interstitial cystis    Primary insomnia 2017    Psoriatic arthritis ([address]     Psychiatric disorder     anxiety, obsessive comp.      Seizure disorder ([address] 2017    Seizures ([address]      Past Surgical History:   Procedure Laterality Date    APPENDECTOMY      BREAST REDUCTION SURGERY  2002    BREAST SURGERY      reduction    CARPAL TUNNEL RELEASE Right     CARPAL TUNNEL RELEASE Right 04/10/2019    CHOLECYSTECTOMY      GYN      D&E/ Tubal    HEENT      tonsils/ sinus    LAP,TUBAL CAUTERY      ORTHOPEDIC SURGERY      right ankle, left foot    OTHER SURGICAL HISTORY      Lymph node removal, right side of neck    ME ABDOMEN SURGERY PROC UNLISTED      inguinal hernia age 14    TONSILLECTOMY       Current Outpatient Medications   Medication Sig Dispense Refill    pentosan polysulfate (ELMIRON) 100 MG capsule Take 1 capsule by mouth in the morning and at bedtime 180 capsule 1    vitamin D (ERGOCALCIFEROL) 1.25 MG (74668 UT) CAPS capsule TAKE 1 CAPSULE BY MOUTH EVERY 7 DAYS 4 capsule 3    rosuvastatin (CRESTOR) 5 MG tablet Take 1 tablet by mouth daily TAKE 1 TABLET BY MOUTH EVERY NIGHT 30 tablet 5    metFORMIN (GLUCOPHAGE-XR) 500 MG extended release tablet Take 1 tablet by mouth daily (with breakfast) TAKE 1 TABLET BY MOUTH DAILY WITH DINNER 30 tablet 5    venlafaxine (EFFEXOR XR) 75 MG extended release capsule Take 1 capsule by mouth daily 30 capsule 5    pantoprazole (PROTONIX) 40 MG tablet Take 1 tablet by mouth daily 30 tablet 5    Rimegepant Sulfate (NURTEC) 75 MG TBDP Take 1 tablet by mouth daily as needed (Headache) 30 tablet 2    levETIRAcetam (KEPPRA) 250 MG tablet       albuterol sulfate  (90 Base) MCG/ACT inhaler Inhale into the lungs      cyanocobalamin 1000 MCG tablet Take 1,000 mcg by mouth daily      gabapentin (NEURONTIN) 600 MG tablet Take 1,200 mg by mouth 4 times daily.       levETIRAcetam (KEPPRA) 750 MG tablet Take 1,000 mg by mouth 2 times daily      loratadine (CLARITIN) 10 MG tablet Take 10 mg by mouth      medroxyPROGESTERone (PROVERA) 10 MG tablet TAKE 1 TABLET BY MOUTH FOR 14 DAYS EACH MONTH      Etanercept 50 MG/ML SOCT Inject 50 mg into the skin every 7 days      levETIRAcetam (KEPPRA) 250 MG tablet       meloxicam (MOBIC) 15 MG tablet Take 1 tablet by mouth daily Take 1 tablet PO daily as needed for pain 30 tablet 1     No current facility-administered medications for this visit. Allergies   Allergen Reactions    Latex Hives     Other reaction(s): Hives/Swelling-Allergy    Codeine Anaphylaxis     Other reaction(s): Anaphylactic shock-Allergy    Hydromorphone Anaphylaxis     Other reaction(s): Anaphylactic shock-Allergy    Metoclopramide Anaphylaxis     Other reaction(s): Anaphylactic shock-Allergy    Penicillins Anaphylaxis     Pt states rash & redness. No swelling in throat. Dr ordered. Test dose before surgery  Other reaction(s): Anaphylactic shock-Allergy    Sulfa Antibiotics Anaphylaxis     Other reaction(s):  Anaphylactic shock-Allergy    Adhesive Tape Hives     Social History     Socioeconomic History    Marital status: Single     Spouse name: Not on file    Number of children: Not on file    Years of education: Not on file    Highest education level: Not on file   Occupational History    Not on file   Tobacco Use    Smoking status: Former     Packs/day: 0.25     Types: Cigarettes     Quit date: 5/1/2019     Years since quitting: 3.5    Smokeless tobacco: Never   Substance and Sexual Activity    Alcohol use: No    Drug use: No    Sexual activity: Not on file     Comment: BTL   Other Topics Concern    Not on file   Social History Narrative    Abuse: Feels safe at home, no history of physical abuse, no history of sexual abuse       Social Determinants of Health     Financial Resource Strain: Not on file   Food Insecurity: Not on file   Transportation Needs: Not on file   Physical Activity: Not on file   Stress: Not on file   Social Connections: Not on file   Intimate Partner Violence: Not on file   Housing Stability: Not on file     Family History   Problem Relation Age of Onset    Breast Cancer Maternal Great Grandmother     Breast Cancer Paternal Great Grandmother     Heart Disease Maternal Grandfather     Breast Cancer Maternal Grandmother         52's    Heart Disease Maternal Grandmother     Diabetes Maternal Grandmother     Cancer Sister         skin    Heart Attack Mother 40        had several TIAs    Cancer Mother     Diabetes Mother     Heart Disease Mother         Aortic Rupture    Stroke Mother     Colon Cancer Neg Hx     Uterine Cancer Neg Hx     Ovarian Cancer Neg Hx        Review of Systems  Constitutional: Positive for malaise/fatigue and headaches. Skin: Positive for itching. ENT: Positive for dry mouth. Respiratory: Positive for shortness of breath. GI: Positive for indigestion and heartburn. Genitourinary: Positive for urinary burning, flank pain, history of urolithiasis, nocturia, urgency, leakage w/ urge, frequent urination, endometriosis, vaginal pain and hysterectomy. Number of pregnancies: 3. Number of births: 2. Musculoskeletal: Positive for back pain, bone pain, arthralgias, tenderness, muscle weakness and neck pain. Neurological: Positive for dizziness, numbness and seizures. Endocrine: Positive for cold intolerance, thirst, excessive urination, fatigue and heat intolerance. Hem/Lymphatic: Positive for easy bruising.     Urinalysis  UA - Dipstick  Results for orders placed or performed in visit on 11/09/22   AMB POC URINALYSIS DIP STICK AUTO W/O MICRO   Result Value Ref Range    Color (UA POC)      Clarity (UA POC)      Glucose, Urine, POC Negative Negative    Bilirubin, Urine, POC Negative Negative    KETONES, Urine, POC Negative Negative    Specific Gravity, Urine, POC 1.020 1.001 - 1.035    Blood (UA POC) Moderate Negative    pH, Urine, POC 6.5 4.6 - 8.0    Protein, Urine, POC Negative Negative    Urobilinogen, POC 0.2-1 mg/dL     Nitrite, Urine, POC Negative Negative    Leukocyte Esterase, Urine, POC Trace Negative       UA - Micro  WBC - 0  RBC - 0  Bacteria - 0  Epith - 0    PHYSICAL EXAM    General appearance - well appearing and in no distress  Mental status - alert, oriented to person, place, and time  Neck - supple, no significant adenopathy  Chest/Lung-  Quiet, even and easy respiratory effort without use of accessory muscles  Skin - normal coloration and turgor, no rashes      Assessment and Plan    ICD-10-CM    1. Interstitial cystitis  N30.10 AMB POC URINALYSIS DIP STICK AUTO W/O MICRO      2. Microhematuria  R31.29       3. Renal stone  N20.0           IC- cont IC diet. We discussed alternative therapy to Elmiron. She is NOT interested at this time due to other life issues. Discussed Elmiron and known SE (including retinal thinning/blindness). She is aware but opts to cont the medication. She does follow w optometry and reports they are aware of medication use. She will cont to have routine eye exams. Cont Elmiron 100 mg PO BID. Microhematuria- urine normal. Cont to follow. Renal stone- discussed upper track imaging. She opts to [facility] for now. RTO in 6 months for follow up. Advised to call sooner if sx worsen. Compa Conroy, TYSONP, APRN - CNP  Dr. Beatriz Angela is supervising physician today and he approves plan of care.

## 2023-01-17 ENCOUNTER — TELEPHONE (OUTPATIENT)
Dept: ORTHOPEDIC SURGERY | Age: 43
End: 2023-01-17

## 2023-01-17 NOTE — TELEPHONE ENCOUNTER
Patient has moved to [place], requested 8-25-22 shoulder mri to [facility] [address] [id number]

## 2023-03-15 DIAGNOSIS — F33.0 MILD EPISODE OF RECURRENT MAJOR DEPRESSIVE DISORDER (HCC): ICD-10-CM

## 2023-03-16 ENCOUNTER — TELEPHONE (OUTPATIENT)
Dept: OBGYN CLINIC | Age: 43
End: 2023-03-16

## 2023-03-16 NOTE — TELEPHONE ENCOUNTER
RN called patient to schedule AE, no answer, LVM with no details given. RN responded to patient via [employer].

## 2023-03-16 NOTE — TELEPHONE ENCOUNTER
RN called patient back, patient verified Linda Hamilton stated that patient is due for an AE and that once the AE is scheduled a one time refill of Provera can be sent into preferred pharmacy. Patient confirms and schedules AE on 3/29/2023 and states she will be okay with her RX of Provera until then. Xolair Counseling:  Patient informed of potential adverse effects including but not limited to fever, muscle aches, rash and allergic reactions.  The patient verbalized understanding of the proper use and possible adverse effects of Xolair.  All of the patient's questions and concerns were addressed.

## 2023-03-20 RX ORDER — VENLAFAXINE HYDROCHLORIDE 75 MG/1
75 CAPSULE, EXTENDED RELEASE ORAL DAILY
Qty: 30 CAPSULE | Refills: 0 | Status: SHIPPED | OUTPATIENT
Start: 2023-03-20

## 2023-03-29 ENCOUNTER — OFFICE VISIT (OUTPATIENT)
Dept: OBGYN CLINIC | Age: 43
End: 2023-03-29

## 2023-03-29 VITALS
BODY MASS INDEX: 28.7 KG/M2 | DIASTOLIC BLOOD PRESSURE: 84 MMHG | HEIGHT: 63 IN | SYSTOLIC BLOOD PRESSURE: 120 MMHG | WEIGHT: 162 LBS

## 2023-03-29 DIAGNOSIS — N92.0 MENORRHAGIA WITH REGULAR CYCLE: ICD-10-CM

## 2023-03-29 DIAGNOSIS — M35.1 MIXED CONNECTIVE TISSUE DISEASE (HCC): ICD-10-CM

## 2023-03-29 DIAGNOSIS — E55.9 VITAMIN D DEFICIENCY: ICD-10-CM

## 2023-03-29 DIAGNOSIS — R56.9 SEIZURES (HCC): ICD-10-CM

## 2023-03-29 DIAGNOSIS — E78.00 PURE HYPERCHOLESTEROLEMIA, UNSPECIFIED: ICD-10-CM

## 2023-03-29 DIAGNOSIS — R73.01 IMPAIRED FASTING GLUCOSE: ICD-10-CM

## 2023-03-29 DIAGNOSIS — E78.00 HYPERCHOLESTEROLEMIA: Primary | ICD-10-CM

## 2023-03-29 DIAGNOSIS — Z12.31 SCREENING MAMMOGRAM, ENCOUNTER FOR: ICD-10-CM

## 2023-03-29 DIAGNOSIS — Z01.419 WOMEN'S ANNUAL ROUTINE GYNECOLOGICAL EXAMINATION: Primary | ICD-10-CM

## 2023-03-29 RX ORDER — MEDROXYPROGESTERONE ACETATE 10 MG/1
10 TABLET ORAL DAILY
Qty: 14 TABLET | Refills: 11 | Status: SHIPPED | OUTPATIENT
Start: 2023-03-29

## 2023-03-29 RX ORDER — FOLIC ACID 1 MG/1
1000 TABLET ORAL DAILY
COMMUNITY
Start: 2023-02-28

## 2023-03-29 RX ORDER — PREDNISONE 1 MG/1
TABLET ORAL
COMMUNITY
Start: 2023-02-28

## 2023-03-29 SDOH — ECONOMIC STABILITY: HOUSING INSECURITY
IN THE LAST 12 MONTHS, WAS THERE A TIME WHEN YOU DID NOT HAVE A STEADY PLACE TO SLEEP OR SLEPT IN A SHELTER (INCLUDING NOW)?: PATIENT REFUSED

## 2023-03-29 SDOH — ECONOMIC STABILITY: FOOD INSECURITY: WITHIN THE PAST 12 MONTHS, THE FOOD YOU BOUGHT JUST DIDN'T LAST AND YOU DIDN'T HAVE MONEY TO GET MORE.: PATIENT DECLINED

## 2023-03-29 SDOH — ECONOMIC STABILITY: INCOME INSECURITY: HOW HARD IS IT FOR YOU TO PAY FOR THE VERY BASICS LIKE FOOD, HOUSING, MEDICAL CARE, AND HEATING?: PATIENT DECLINED

## 2023-03-29 SDOH — ECONOMIC STABILITY: FOOD INSECURITY: WITHIN THE PAST 12 MONTHS, YOU WORRIED THAT YOUR FOOD WOULD RUN OUT BEFORE YOU GOT MONEY TO BUY MORE.: PATIENT DECLINED

## 2023-03-29 ASSESSMENT — PATIENT HEALTH QUESTIONNAIRE - PHQ9
DEPRESSION UNABLE TO ASSESS: FUNCTIONAL CAPACITY MOTIVATION LIMITS ACCURACY
DEPRESSION UNABLE TO ASSESS: FUNCTIONAL CAPACITY MOTIVATION LIMITS ACCURACY

## 2023-03-29 NOTE — PATIENT INSTRUCTIONS
They will call you to schedule mammogram near the end of June  Follow up as needed or next year for your yearly female exam.  Thanks for coming to see us today and letting us take care of you!

## 2023-03-29 NOTE — PROGRESS NOTES
Pt comes in today for AE and med refill of provera. LAST PAP:  02/09/2022 Negative, Negative     LAST MAMMO:  06/27/2022    LMP:  Patient's last menstrual period was 03/07/2023 (exact date).     BIRTH CONTROL:  Tubal     TOBACCO USE:  No    FAMILY HISTORY OF:   Breast Cancer:  Yes   Ovarian Cancer:  No   Uterine Cancer:  No   Colon Cancer:  No    Vitals:    03/29/23 1535   BP: 120/84   Site: Left Upper Arm   Position: Sitting   Weight: 162 lb (73.5 kg)   Height: 5' 3\" (1.6 m)        [place], [place]  03/29/23  3:49 PM

## 2023-04-05 DIAGNOSIS — E78.00 HYPERCHOLESTEROLEMIA: ICD-10-CM

## 2023-04-05 DIAGNOSIS — R73.01 IMPAIRED FASTING GLUCOSE: ICD-10-CM

## 2023-04-13 RX ORDER — METFORMIN HYDROCHLORIDE 500 MG/1
TABLET, EXTENDED RELEASE ORAL
Qty: 30 TABLET | Refills: 5 | OUTPATIENT
Start: 2023-04-13

## 2023-04-13 RX ORDER — PENTOSAN POLYSULFATE SODIUM 100 MG/1
CAPSULE, GELATIN COATED ORAL
Qty: 90 CAPSULE | OUTPATIENT
Start: 2023-04-13

## 2023-04-13 RX ORDER — ROSUVASTATIN CALCIUM 5 MG/1
TABLET, COATED ORAL
Qty: 30 TABLET | Refills: 5 | OUTPATIENT
Start: 2023-04-13

## 2023-04-24 DIAGNOSIS — E78.00 HYPERCHOLESTEROLEMIA: ICD-10-CM

## 2023-04-27 DIAGNOSIS — F33.0 MILD EPISODE OF RECURRENT MAJOR DEPRESSIVE DISORDER (HCC): ICD-10-CM

## 2023-04-27 RX ORDER — VENLAFAXINE HYDROCHLORIDE 75 MG/1
75 CAPSULE, EXTENDED RELEASE ORAL DAILY
Qty: 30 CAPSULE | Refills: 0 | OUTPATIENT
Start: 2023-04-27

## 2023-04-28 PROBLEM — Z01.419 WOMEN'S ANNUAL ROUTINE GYNECOLOGICAL EXAMINATION: Status: RESOLVED | Noted: 2022-02-09 | Resolved: 2023-04-28

## 2023-04-28 PROBLEM — Z12.31 SCREENING MAMMOGRAM, ENCOUNTER FOR: Status: RESOLVED | Noted: 2023-03-29 | Resolved: 2023-04-28

## 2023-05-02 RX ORDER — ROSUVASTATIN CALCIUM 5 MG/1
TABLET, COATED ORAL
Qty: 30 TABLET | Refills: 5 | OUTPATIENT
Start: 2023-05-02